# Patient Record
Sex: FEMALE | Race: WHITE | NOT HISPANIC OR LATINO | Employment: FULL TIME | URBAN - METROPOLITAN AREA
[De-identification: names, ages, dates, MRNs, and addresses within clinical notes are randomized per-mention and may not be internally consistent; named-entity substitution may affect disease eponyms.]

---
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
## 2024-04-12 ENCOUNTER — TELEPHONE (OUTPATIENT)
Dept: OBSTETRICS AND GYNECOLOGY | Facility: CLINIC | Age: 19
End: 2024-04-12
Payer: COMMERCIAL

## 2024-04-12 NOTE — TELEPHONE ENCOUNTER
----- Message from Karen Bryant sent at 4/12/2024  2:07 PM CDT -----  Contact: Self  Nithin Hammonds  MRN: [id number]  Home Phone      [phone]  Work Phone      Not on file.  Mobile          [phone]    Patient Care Team:  Fan James NP as PCP - General (Family Medicine)  Manuel Goyal MD as Obstetrician (Obstetrics)  OB? No  What phone number can you be reached at? [phone]  Message: pt has questions about chlamydia

## 2024-04-12 NOTE — TELEPHONE ENCOUNTER
Pt was called and she stated she had vaginal cultures done and desires second opinion. Pt had cultures done in [place] by another MD. Appt scheduled and she voiced understanding.

## 2024-04-15 ENCOUNTER — TELEPHONE (OUTPATIENT)
Dept: OBSTETRICS AND GYNECOLOGY | Facility: CLINIC | Age: 19
End: 2024-04-15
Payer: COMMERCIAL

## 2024-04-15 NOTE — TELEPHONE ENCOUNTER
Patient states that she was having some vaginal odor and discharge and went to a clinic in [place] on 3/26/24 and had STD testing that was negative. She reports she went back on 4/9/24 and was retested and that one came back positive for Trichomoniasis. Patient states the symptoms were the same for both times. Patient states they called her today to notify her of the positive Trichomoniasis. Patient states that they sent in a prescription for abx to her pharmacy. Patient would like a second opinion since one negative and one positive within 1 week. Patient states that she has appt for Wednesday but her work schedule changed and would like to know if she could come tomorrow instead. Patients appt moved to tomorrow. Patient instructed to  the abx but wait until after she sees Dr Stallings tomorrow to start taking them. Patient verbalized understanding.

## 2024-04-15 NOTE — TELEPHONE ENCOUNTER
----- Message from Priscilla Ruff MA sent at 4/15/2024 10:01 AM CDT -----  Contact: self  Nithin Hammonds  MRN: [id number]  Home Phone      [phone]  Work Phone      Not on file.  Mobile          [phone]    Patient Care Team:  Fan James NP as PCP - General (Family Medicine)  Manuel Goyal MD as Obstetrician (Obstetrics)  Katey Kirkpatrick MD as Consulting Physician (Obstetrics and Gynecology)  OB? No  What phone number can you be reached at? [phone]  Message: Has questions regarding up coming appt and last appt.

## 2024-04-16 ENCOUNTER — OFFICE VISIT (OUTPATIENT)
Dept: OBSTETRICS AND GYNECOLOGY | Facility: CLINIC | Age: 19
End: 2024-04-16
Payer: COMMERCIAL

## 2024-04-16 VITALS
RESPIRATION RATE: 16 BRPM | HEART RATE: 93 BPM | OXYGEN SATURATION: 100 % | HEIGHT: 63 IN | SYSTOLIC BLOOD PRESSURE: 120 MMHG | DIASTOLIC BLOOD PRESSURE: 78 MMHG | BODY MASS INDEX: 26.39 KG/M2 | WEIGHT: 148.94 LBS

## 2024-04-16 DIAGNOSIS — N89.8 VAGINAL ODOR: Primary | ICD-10-CM

## 2024-04-16 PROCEDURE — 3078F DIAST BP <80 MM HG: CPT | Mod: CPTII,S$GLB,, | Performed by: OBSTETRICS & GYNECOLOGY

## 2024-04-16 PROCEDURE — 3008F BODY MASS INDEX DOCD: CPT | Mod: CPTII,S$GLB,, | Performed by: OBSTETRICS & GYNECOLOGY

## 2024-04-16 PROCEDURE — 99213 OFFICE O/P EST LOW 20 MIN: CPT | Mod: S$GLB,,, | Performed by: OBSTETRICS & GYNECOLOGY

## 2024-04-16 PROCEDURE — 3074F SYST BP LT 130 MM HG: CPT | Mod: CPTII,S$GLB,, | Performed by: OBSTETRICS & GYNECOLOGY

## 2024-04-16 PROCEDURE — 99999 PR PBB SHADOW E&M-EST. PATIENT-LVL III: CPT | Mod: PBBFAC,,, | Performed by: OBSTETRICS & GYNECOLOGY

## 2024-04-16 PROCEDURE — 1159F MED LIST DOCD IN RCRD: CPT | Mod: CPTII,S$GLB,, | Performed by: OBSTETRICS & GYNECOLOGY

## 2024-04-16 PROCEDURE — 81514 NFCT DS BV&VAGINITIS DNA ALG: CPT | Performed by: OBSTETRICS & GYNECOLOGY

## 2024-04-17 ENCOUNTER — PATIENT MESSAGE (OUTPATIENT)
Dept: OBSTETRICS AND GYNECOLOGY | Facility: CLINIC | Age: 19
End: 2024-04-17

## 2024-04-17 LAB
BACTERIAL VAGINOSIS DNA: NEGATIVE
CANDIDA GLABRATA DNA: NEGATIVE
CANDIDA KRUSEI DNA: NEGATIVE
CANDIDA RRNA VAG QL PROBE: NEGATIVE
T VAGINALIS RRNA GENITAL QL PROBE: NEGATIVE

## 2024-04-22 ENCOUNTER — TELEPHONE (OUTPATIENT)
Dept: OBSTETRICS AND GYNECOLOGY | Facility: CLINIC | Age: 19
End: 2024-04-22
Payer: COMMERCIAL

## 2024-04-22 NOTE — TELEPHONE ENCOUNTER
Spoke with pt in regards to referral. Pt says that she already saw another provider and no longer needs the appointment.

## 2024-05-02 ENCOUNTER — TELEPHONE (OUTPATIENT)
Dept: OBSTETRICS AND GYNECOLOGY | Facility: CLINIC | Age: 19
End: 2024-05-02
Payer: COMMERCIAL

## 2024-05-02 NOTE — TELEPHONE ENCOUNTER
----- Message from Karen Bryant sent at 5/2/2024  8:52 AM CDT -----  Contact: Self  Nithin Hammonds  MRN: [id number]  Home Phone      [phone]  Work Phone      Not on file.  Mobile          [phone]    Patient Care Team:  Fan James NP as PCP - General (Family Medicine)  Manuel Goyal MD as Obstetrician (Obstetrics)  Katey Kirkpatrick MD as Consulting Physician (Obstetrics and Gynecology)  OB? No  What phone number can you be reached at? [phone]  Message: pt states she is having pain in her stomach. States she has been without a period for a month but has had negative pregnancy tests

## 2024-06-05 ENCOUNTER — PATIENT MESSAGE (OUTPATIENT)
Dept: OBSTETRICS AND GYNECOLOGY | Facility: CLINIC | Age: 19
End: 2024-06-05
Payer: COMMERCIAL

## 2024-07-16 ENCOUNTER — PATIENT MESSAGE (OUTPATIENT)
Dept: OBSTETRICS AND GYNECOLOGY | Facility: CLINIC | Age: 19
End: 2024-07-16
Payer: COMMERCIAL

## 2024-10-28 ENCOUNTER — LAB VISIT (OUTPATIENT)
Dept: LAB | Facility: HOSPITAL | Age: 19
End: 2024-10-28
Attending: STUDENT IN AN ORGANIZED HEALTH CARE EDUCATION/TRAINING PROGRAM
Payer: COMMERCIAL

## 2024-10-28 ENCOUNTER — HOSPITAL ENCOUNTER (EMERGENCY)
Facility: HOSPITAL | Age: 19
Discharge: HOME | End: 2024-10-28
Attending: SURGERY
Payer: COMMERCIAL

## 2024-10-28 ENCOUNTER — PATIENT MESSAGE (OUTPATIENT)
Dept: OBSTETRICS AND GYNECOLOGY | Facility: CLINIC | Age: 19
End: 2024-10-28
Payer: COMMERCIAL

## 2024-10-28 VITALS
BODY MASS INDEX: 28.93 KG/M2 | DIASTOLIC BLOOD PRESSURE: 68 MMHG | HEART RATE: 88 BPM | HEIGHT: 63 IN | TEMPERATURE: 99 F | SYSTOLIC BLOOD PRESSURE: 117 MMHG | OXYGEN SATURATION: 100 % | WEIGHT: 163.25 LBS | RESPIRATION RATE: 19 BRPM

## 2024-10-28 DIAGNOSIS — Z32.00 ENCOUNTER FOR PREGNANCY TEST, RESULT UNKNOWN: ICD-10-CM

## 2024-10-28 DIAGNOSIS — Z32.00 ENCOUNTER FOR PREGNANCY TEST, RESULT UNKNOWN: Primary | ICD-10-CM

## 2024-10-28 DIAGNOSIS — F41.9 ANXIETY: Primary | ICD-10-CM

## 2024-10-28 DIAGNOSIS — O20.9 VAGINAL BLEEDING IN PREGNANCY, FIRST TRIMESTER: ICD-10-CM

## 2024-10-28 LAB
ALBUMIN SERPL BCP-MCNC: 4.2 G/DL (ref 3.5–5.2)
ALP SERPL-CCNC: 58 U/L (ref 40–150)
ALT SERPL W/O P-5'-P-CCNC: 18 U/L (ref 10–44)
ANION GAP SERPL CALC-SCNC: 12 MMOL/L (ref 8–16)
AST SERPL-CCNC: 15 U/L (ref 10–40)
B-HCG UR QL: POSITIVE
BACTERIA #/AREA URNS HPF: ABNORMAL /HPF
BASOPHILS # BLD AUTO: 0.07 K/UL (ref 0–0.2)
BASOPHILS NFR BLD: 0.9 % (ref 0–1.9)
BILIRUB SERPL-MCNC: 0.4 MG/DL (ref 0.1–1)
BILIRUB UR QL STRIP: NEGATIVE
BUN SERPL-MCNC: 10 MG/DL (ref 6–20)
CALCIUM SERPL-MCNC: 9.2 MG/DL (ref 8.7–10.5)
CHLORIDE SERPL-SCNC: 106 MMOL/L (ref 95–110)
CLARITY UR: ABNORMAL
CO2 SERPL-SCNC: 23 MMOL/L (ref 23–29)
COLOR UR: YELLOW
CREAT SERPL-MCNC: 0.9 MG/DL (ref 0.5–1.4)
DIFFERENTIAL METHOD BLD: NORMAL
EOSINOPHIL # BLD AUTO: 0.4 K/UL (ref 0–0.5)
EOSINOPHIL NFR BLD: 5.2 % (ref 0–8)
ERYTHROCYTE [DISTWIDTH] IN BLOOD BY AUTOMATED COUNT: 12.6 % (ref 11.5–14.5)
EST. GFR  (NO RACE VARIABLE): >60 ML/MIN/1.73 M^2
GLUCOSE SERPL-MCNC: 92 MG/DL (ref 70–110)
GLUCOSE UR QL STRIP: NEGATIVE
HCG INTACT+B SERPL-ACNC: 345 MIU/ML
HCT VFR BLD AUTO: 37.1 % (ref 37–48.5)
HGB BLD-MCNC: 12.5 G/DL (ref 12–16)
HGB UR QL STRIP: ABNORMAL
HYALINE CASTS #/AREA URNS LPF: 0 /LPF
IMM GRANULOCYTES # BLD AUTO: 0.01 K/UL (ref 0–0.04)
IMM GRANULOCYTES NFR BLD AUTO: 0.1 % (ref 0–0.5)
KETONES UR QL STRIP: ABNORMAL
LEUKOCYTE ESTERASE UR QL STRIP: NEGATIVE
LYMPHOCYTES # BLD AUTO: 3.2 K/UL (ref 1–4.8)
LYMPHOCYTES NFR BLD: 39.6 % (ref 18–48)
MCH RBC QN AUTO: 29.8 PG (ref 27–31)
MCHC RBC AUTO-ENTMCNC: 33.7 G/DL (ref 32–36)
MCV RBC AUTO: 88 FL (ref 82–98)
MICROSCOPIC COMMENT: ABNORMAL
MONOCYTES # BLD AUTO: 0.7 K/UL (ref 0.3–1)
MONOCYTES NFR BLD: 8.1 % (ref 4–15)
NEUTROPHILS # BLD AUTO: 3.7 K/UL (ref 1.8–7.7)
NEUTROPHILS NFR BLD: 46.1 % (ref 38–73)
NITRITE UR QL STRIP: NEGATIVE
NRBC BLD-RTO: 0 /100 WBC
PH UR STRIP: 7 [PH] (ref 5–8)
PLATELET # BLD AUTO: 282 K/UL (ref 150–450)
PMV BLD AUTO: 9.6 FL (ref 9.2–12.9)
POTASSIUM SERPL-SCNC: 3.6 MMOL/L (ref 3.5–5.1)
PROT SERPL-MCNC: 7.5 G/DL (ref 6–8.4)
PROT UR QL STRIP: ABNORMAL
RBC # BLD AUTO: 4.2 M/UL (ref 4–5.4)
RBC #/AREA URNS HPF: >100 /HPF (ref 0–4)
SODIUM SERPL-SCNC: 141 MMOL/L (ref 136–145)
SP GR UR STRIP: 1.02 (ref 1–1.03)
SQUAMOUS #/AREA URNS HPF: 3 /HPF
URN SPEC COLLECT METH UR: ABNORMAL
UROBILINOGEN UR STRIP-ACNC: ABNORMAL EU/DL
WBC # BLD AUTO: 8.06 K/UL (ref 3.9–12.7)
WBC #/AREA URNS HPF: 0 /HPF (ref 0–5)

## 2024-10-28 PROCEDURE — 99283 EMERGENCY DEPT VISIT LOW MDM: CPT

## 2024-10-28 PROCEDURE — 81000 URINALYSIS NONAUTO W/SCOPE: CPT | Performed by: NURSE PRACTITIONER

## 2024-10-28 PROCEDURE — 81025 URINE PREGNANCY TEST: CPT | Performed by: NURSE PRACTITIONER

## 2024-10-28 PROCEDURE — 36415 COLL VENOUS BLD VENIPUNCTURE: CPT | Performed by: STUDENT IN AN ORGANIZED HEALTH CARE EDUCATION/TRAINING PROGRAM

## 2024-10-28 PROCEDURE — 85025 COMPLETE CBC W/AUTO DIFF WBC: CPT | Performed by: NURSE PRACTITIONER

## 2024-10-28 PROCEDURE — 80053 COMPREHEN METABOLIC PANEL: CPT | Performed by: NURSE PRACTITIONER

## 2024-10-28 PROCEDURE — 84702 CHORIONIC GONADOTROPIN TEST: CPT | Performed by: STUDENT IN AN ORGANIZED HEALTH CARE EDUCATION/TRAINING PROGRAM

## 2024-10-29 DIAGNOSIS — O46.90 VAGINAL BLEEDING IN PREGNANCY: Primary | ICD-10-CM

## 2024-10-30 ENCOUNTER — OFFICE VISIT (OUTPATIENT)
Dept: OBSTETRICS AND GYNECOLOGY | Facility: CLINIC | Age: 19
End: 2024-10-30
Payer: COMMERCIAL

## 2024-10-30 ENCOUNTER — LAB VISIT (OUTPATIENT)
Dept: LAB | Facility: HOSPITAL | Age: 19
End: 2024-10-30
Attending: STUDENT IN AN ORGANIZED HEALTH CARE EDUCATION/TRAINING PROGRAM
Payer: COMMERCIAL

## 2024-10-30 VITALS
HEIGHT: 63 IN | BODY MASS INDEX: 29.05 KG/M2 | WEIGHT: 163.94 LBS | SYSTOLIC BLOOD PRESSURE: 112 MMHG | HEART RATE: 74 BPM | DIASTOLIC BLOOD PRESSURE: 78 MMHG

## 2024-10-30 DIAGNOSIS — O20.9 BLEEDING IN EARLY PREGNANCY: Primary | ICD-10-CM

## 2024-10-30 DIAGNOSIS — Z11.3 SCREENING EXAMINATION FOR VENEREAL DISEASE: ICD-10-CM

## 2024-10-30 DIAGNOSIS — Z32.01 POSITIVE PREGNANCY TEST: ICD-10-CM

## 2024-10-30 DIAGNOSIS — O46.90 VAGINAL BLEEDING IN PREGNANCY: ICD-10-CM

## 2024-10-30 LAB — HCG INTACT+B SERPL-ACNC: 580 MIU/ML

## 2024-10-30 PROCEDURE — 36415 COLL VENOUS BLD VENIPUNCTURE: CPT | Performed by: STUDENT IN AN ORGANIZED HEALTH CARE EDUCATION/TRAINING PROGRAM

## 2024-10-30 PROCEDURE — 84702 CHORIONIC GONADOTROPIN TEST: CPT | Performed by: STUDENT IN AN ORGANIZED HEALTH CARE EDUCATION/TRAINING PROGRAM

## 2024-10-30 PROCEDURE — 99999 PR PBB SHADOW E&M-EST. PATIENT-LVL III: CPT | Mod: PBBFAC,,, | Performed by: STUDENT IN AN ORGANIZED HEALTH CARE EDUCATION/TRAINING PROGRAM

## 2024-10-30 PROCEDURE — 87591 N.GONORRHOEAE DNA AMP PROB: CPT | Performed by: STUDENT IN AN ORGANIZED HEALTH CARE EDUCATION/TRAINING PROGRAM

## 2024-10-30 PROCEDURE — 87086 URINE CULTURE/COLONY COUNT: CPT | Performed by: STUDENT IN AN ORGANIZED HEALTH CARE EDUCATION/TRAINING PROGRAM

## 2024-10-30 PROCEDURE — 87491 CHLMYD TRACH DNA AMP PROBE: CPT | Performed by: STUDENT IN AN ORGANIZED HEALTH CARE EDUCATION/TRAINING PROGRAM

## 2024-10-30 RX ORDER — CIPROFLOXACIN 500 MG/1
500 TABLET ORAL 2 TIMES DAILY
Qty: 14 TABLET | Refills: 0 | Status: CANCELLED | OUTPATIENT
Start: 2024-10-30

## 2024-10-30 RX ORDER — PHENAZOPYRIDINE HYDROCHLORIDE 200 MG/1
200 TABLET, FILM COATED ORAL 3 TIMES DAILY
Qty: 9 TABLET | Refills: 0 | Status: CANCELLED | OUTPATIENT
Start: 2024-10-30 | End: 2024-11-09

## 2024-10-30 NOTE — PROGRESS NOTES
Beta HCG did rise but not quite as much as I would have liked. I would like one more value in 48 hours.

## 2024-10-31 LAB
BACTERIA UR CULT: NORMAL
BACTERIA UR CULT: NORMAL
C TRACH DNA SPEC QL NAA+PROBE: NOT DETECTED
N GONORRHOEA DNA SPEC QL NAA+PROBE: NOT DETECTED

## 2024-11-01 ENCOUNTER — TELEPHONE (OUTPATIENT)
Dept: OBSTETRICS AND GYNECOLOGY | Facility: CLINIC | Age: 19
End: 2024-11-01
Payer: MEDICAID

## 2024-11-01 ENCOUNTER — LAB VISIT (OUTPATIENT)
Dept: LAB | Facility: HOSPITAL | Age: 19
End: 2024-11-01
Attending: STUDENT IN AN ORGANIZED HEALTH CARE EDUCATION/TRAINING PROGRAM
Payer: MEDICAID

## 2024-11-01 ENCOUNTER — HOSPITAL ENCOUNTER (OUTPATIENT)
Facility: HOSPITAL | Age: 19
Discharge: HOME | End: 2024-11-02
Attending: STUDENT IN AN ORGANIZED HEALTH CARE EDUCATION/TRAINING PROGRAM | Admitting: OBSTETRICS & GYNECOLOGY
Payer: MEDICAID

## 2024-11-01 DIAGNOSIS — O20.9 BLEEDING IN EARLY PREGNANCY: ICD-10-CM

## 2024-11-01 DIAGNOSIS — O20.9 VAGINAL BLEEDING IN PREGNANCY, FIRST TRIMESTER: ICD-10-CM

## 2024-11-01 DIAGNOSIS — O00.90 RUPTURED ECTOPIC PREGNANCY: Primary | ICD-10-CM

## 2024-11-01 LAB — HCG INTACT+B SERPL-ACNC: 1136 MIU/ML

## 2024-11-01 PROCEDURE — 81025 URINE PREGNANCY TEST: CPT | Performed by: STUDENT IN AN ORGANIZED HEALTH CARE EDUCATION/TRAINING PROGRAM

## 2024-11-01 PROCEDURE — 84702 CHORIONIC GONADOTROPIN TEST: CPT | Performed by: STUDENT IN AN ORGANIZED HEALTH CARE EDUCATION/TRAINING PROGRAM

## 2024-11-01 PROCEDURE — 86900 BLOOD TYPING SEROLOGIC ABO: CPT | Performed by: STUDENT IN AN ORGANIZED HEALTH CARE EDUCATION/TRAINING PROGRAM

## 2024-11-01 PROCEDURE — 36415 COLL VENOUS BLD VENIPUNCTURE: CPT | Performed by: STUDENT IN AN ORGANIZED HEALTH CARE EDUCATION/TRAINING PROGRAM

## 2024-11-01 PROCEDURE — 85025 COMPLETE CBC W/AUTO DIFF WBC: CPT | Performed by: STUDENT IN AN ORGANIZED HEALTH CARE EDUCATION/TRAINING PROGRAM

## 2024-11-01 PROCEDURE — 80053 COMPREHEN METABOLIC PANEL: CPT | Performed by: STUDENT IN AN ORGANIZED HEALTH CARE EDUCATION/TRAINING PROGRAM

## 2024-11-01 PROCEDURE — 99285 EMERGENCY DEPT VISIT HI MDM: CPT | Mod: 25

## 2024-11-01 PROCEDURE — 81000 URINALYSIS NONAUTO W/SCOPE: CPT | Performed by: STUDENT IN AN ORGANIZED HEALTH CARE EDUCATION/TRAINING PROGRAM

## 2024-11-01 PROCEDURE — 83690 ASSAY OF LIPASE: CPT | Performed by: STUDENT IN AN ORGANIZED HEALTH CARE EDUCATION/TRAINING PROGRAM

## 2024-11-01 NOTE — LETTER
November 2, 2024         [address] [facility] [address]  Phone: [phone]  Fax: [phone]       Patient: Nithin Hammonds   YOB: 2005  Date of Visit: 11/02/2024    To Whom It May Concern:    Viet Hammonds  was at [facility] on 11/02/2024. The patient may return to work/school on 11/11/24 with lifting restrictions (no lifting >10lbs )and no strenuous activity until cleared by doctor. If you have any questions or concerns, or if I can be of further assistance, please do not hesitate to contact me.    Sincerely,      Jane Ruff RN

## 2024-11-02 ENCOUNTER — ANESTHESIA EVENT (OUTPATIENT)
Dept: SURGERY | Facility: HOSPITAL | Age: 19
End: 2024-11-02
Payer: MEDICAID

## 2024-11-02 ENCOUNTER — ANESTHESIA (OUTPATIENT)
Dept: SURGERY | Facility: HOSPITAL | Age: 19
End: 2024-11-02
Payer: MEDICAID

## 2024-11-02 VITALS
SYSTOLIC BLOOD PRESSURE: 109 MMHG | HEIGHT: 62 IN | HEART RATE: 86 BPM | TEMPERATURE: 98 F | BODY MASS INDEX: 30.39 KG/M2 | WEIGHT: 165.13 LBS | DIASTOLIC BLOOD PRESSURE: 58 MMHG | RESPIRATION RATE: 16 BRPM | OXYGEN SATURATION: 99 %

## 2024-11-02 PROBLEM — O00.90 RUPTURED ECTOPIC PREGNANCY: Status: ACTIVE | Noted: 2024-11-02

## 2024-11-02 LAB
ALBUMIN SERPL BCP-MCNC: 4.1 G/DL (ref 3.5–5.2)
ALP SERPL-CCNC: 64 U/L (ref 40–150)
ALT SERPL W/O P-5'-P-CCNC: 17 U/L (ref 10–44)
AMORPH CRY URNS QL MICRO: ABNORMAL
ANION GAP SERPL CALC-SCNC: 8 MMOL/L (ref 8–16)
AST SERPL-CCNC: 17 U/L (ref 10–40)
B-HCG UR QL: POSITIVE
BACTERIA #/AREA URNS HPF: ABNORMAL /HPF
BASOPHILS # BLD AUTO: 0.03 K/UL (ref 0–0.2)
BASOPHILS # BLD AUTO: 0.05 K/UL (ref 0–0.2)
BASOPHILS # BLD AUTO: 0.06 K/UL (ref 0–0.2)
BASOPHILS # BLD AUTO: 0.09 K/UL (ref 0–0.2)
BASOPHILS NFR BLD: 0.3 % (ref 0–1.9)
BASOPHILS NFR BLD: 0.6 % (ref 0–1.9)
BASOPHILS NFR BLD: 0.6 % (ref 0–1.9)
BASOPHILS NFR BLD: 0.9 % (ref 0–1.9)
BILIRUB SERPL-MCNC: 0.5 MG/DL (ref 0.1–1)
BILIRUB UR QL STRIP: NEGATIVE
BUN SERPL-MCNC: 14 MG/DL (ref 6–20)
CALCIUM SERPL-MCNC: 9.5 MG/DL (ref 8.7–10.5)
CHLORIDE SERPL-SCNC: 104 MMOL/L (ref 95–110)
CLARITY UR: ABNORMAL
CO2 SERPL-SCNC: 27 MMOL/L (ref 23–29)
COLOR UR: YELLOW
CREAT SERPL-MCNC: 0.7 MG/DL (ref 0.5–1.4)
DIFFERENTIAL METHOD BLD: ABNORMAL
EOSINOPHIL # BLD AUTO: 0 K/UL (ref 0–0.5)
EOSINOPHIL # BLD AUTO: 0 K/UL (ref 0–0.5)
EOSINOPHIL # BLD AUTO: 0.3 K/UL (ref 0–0.5)
EOSINOPHIL # BLD AUTO: 0.6 K/UL (ref 0–0.5)
EOSINOPHIL NFR BLD: 0.1 % (ref 0–8)
EOSINOPHIL NFR BLD: 0.5 % (ref 0–8)
EOSINOPHIL NFR BLD: 3.1 % (ref 0–8)
EOSINOPHIL NFR BLD: 6 % (ref 0–8)
ERYTHROCYTE [DISTWIDTH] IN BLOOD BY AUTOMATED COUNT: 12.5 % (ref 11.5–14.5)
EST. GFR  (NO RACE VARIABLE): >60 ML/MIN/1.73 M^2
GLUCOSE SERPL-MCNC: 95 MG/DL (ref 70–110)
GLUCOSE UR QL STRIP: NEGATIVE
HCT VFR BLD AUTO: 34.1 % (ref 37–48.5)
HCT VFR BLD AUTO: 34.5 % (ref 37–48.5)
HCT VFR BLD AUTO: 35.1 % (ref 37–48.5)
HCT VFR BLD AUTO: 37.5 % (ref 37–48.5)
HGB BLD-MCNC: 11.4 G/DL (ref 12–16)
HGB BLD-MCNC: 11.5 G/DL (ref 12–16)
HGB BLD-MCNC: 11.7 G/DL (ref 12–16)
HGB BLD-MCNC: 12.3 G/DL (ref 12–16)
HGB UR QL STRIP: ABNORMAL
IMM GRANULOCYTES # BLD AUTO: 0.02 K/UL (ref 0–0.04)
IMM GRANULOCYTES # BLD AUTO: 0.03 K/UL (ref 0–0.04)
IMM GRANULOCYTES # BLD AUTO: 0.04 K/UL (ref 0–0.04)
IMM GRANULOCYTES # BLD AUTO: 0.04 K/UL (ref 0–0.04)
IMM GRANULOCYTES NFR BLD AUTO: 0.2 % (ref 0–0.5)
IMM GRANULOCYTES NFR BLD AUTO: 0.3 % (ref 0–0.5)
IMM GRANULOCYTES NFR BLD AUTO: 0.3 % (ref 0–0.5)
IMM GRANULOCYTES NFR BLD AUTO: 0.4 % (ref 0–0.5)
KETONES UR QL STRIP: NEGATIVE
LEUKOCYTE ESTERASE UR QL STRIP: NEGATIVE
LIPASE SERPL-CCNC: 20 U/L (ref 4–60)
LYMPHOCYTES # BLD AUTO: 0.7 K/UL (ref 1–4.8)
LYMPHOCYTES # BLD AUTO: 1.4 K/UL (ref 1–4.8)
LYMPHOCYTES # BLD AUTO: 2.3 K/UL (ref 1–4.8)
LYMPHOCYTES # BLD AUTO: 3.4 K/UL (ref 1–4.8)
LYMPHOCYTES NFR BLD: 16.1 % (ref 18–48)
LYMPHOCYTES NFR BLD: 23.9 % (ref 18–48)
LYMPHOCYTES NFR BLD: 32.4 % (ref 18–48)
LYMPHOCYTES NFR BLD: 5.5 % (ref 18–48)
MCH RBC QN AUTO: 29.1 PG (ref 27–31)
MCH RBC QN AUTO: 29.6 PG (ref 27–31)
MCH RBC QN AUTO: 29.7 PG (ref 27–31)
MCH RBC QN AUTO: 29.9 PG (ref 27–31)
MCHC RBC AUTO-ENTMCNC: 32.8 G/DL (ref 32–36)
MCHC RBC AUTO-ENTMCNC: 33.3 G/DL (ref 32–36)
MCHC RBC AUTO-ENTMCNC: 33.3 G/DL (ref 32–36)
MCHC RBC AUTO-ENTMCNC: 33.4 G/DL (ref 32–36)
MCV RBC AUTO: 89 FL (ref 82–98)
MCV RBC AUTO: 90 FL (ref 82–98)
MICROSCOPIC COMMENT: ABNORMAL
MONOCYTES # BLD AUTO: 0.2 K/UL (ref 0.3–1)
MONOCYTES # BLD AUTO: 0.6 K/UL (ref 0.3–1)
MONOCYTES # BLD AUTO: 0.6 K/UL (ref 0.3–1)
MONOCYTES # BLD AUTO: 1 K/UL (ref 0.3–1)
MONOCYTES NFR BLD: 1.6 % (ref 4–15)
MONOCYTES NFR BLD: 6.6 % (ref 4–15)
MONOCYTES NFR BLD: 7 % (ref 4–15)
MONOCYTES NFR BLD: 9.2 % (ref 4–15)
NEUTROPHILS # BLD AUTO: 11 K/UL (ref 1.8–7.7)
NEUTROPHILS # BLD AUTO: 5.3 K/UL (ref 1.8–7.7)
NEUTROPHILS # BLD AUTO: 6.2 K/UL (ref 1.8–7.7)
NEUTROPHILS # BLD AUTO: 6.6 K/UL (ref 1.8–7.7)
NEUTROPHILS NFR BLD: 51.2 % (ref 38–73)
NEUTROPHILS NFR BLD: 65.4 % (ref 38–73)
NEUTROPHILS NFR BLD: 75.6 % (ref 38–73)
NEUTROPHILS NFR BLD: 92.2 % (ref 38–73)
NITRITE UR QL STRIP: NEGATIVE
NRBC BLD-RTO: 0 /100 WBC
PH UR STRIP: >8 [PH] (ref 5–8)
PLATELET # BLD AUTO: 252 K/UL (ref 150–450)
PLATELET # BLD AUTO: 259 K/UL (ref 150–450)
PLATELET # BLD AUTO: 277 K/UL (ref 150–450)
PLATELET # BLD AUTO: 296 K/UL (ref 150–450)
PMV BLD AUTO: 10.1 FL (ref 9.2–12.9)
PMV BLD AUTO: 9.3 FL (ref 9.2–12.9)
PMV BLD AUTO: 9.8 FL (ref 9.2–12.9)
PMV BLD AUTO: 9.9 FL (ref 9.2–12.9)
POTASSIUM SERPL-SCNC: 4.3 MMOL/L (ref 3.5–5.1)
PROT SERPL-MCNC: 7.5 G/DL (ref 6–8.4)
PROT UR QL STRIP: NEGATIVE
RBC # BLD AUTO: 3.84 M/UL (ref 4–5.4)
RBC # BLD AUTO: 3.85 M/UL (ref 4–5.4)
RBC # BLD AUTO: 3.95 M/UL (ref 4–5.4)
RBC # BLD AUTO: 4.22 M/UL (ref 4–5.4)
RBC #/AREA URNS HPF: >100 /HPF (ref 0–4)
SODIUM SERPL-SCNC: 139 MMOL/L (ref 136–145)
SP GR UR STRIP: 1.02 (ref 1–1.03)
SQUAMOUS #/AREA URNS HPF: 2 /HPF
URN SPEC COLLECT METH UR: ABNORMAL
UROBILINOGEN UR STRIP-ACNC: 1 EU/DL
WBC # BLD AUTO: 10.42 K/UL (ref 3.9–12.7)
WBC # BLD AUTO: 11.91 K/UL (ref 3.9–12.7)
WBC # BLD AUTO: 8.76 K/UL (ref 3.9–12.7)
WBC # BLD AUTO: 9.42 K/UL (ref 3.9–12.7)
WBC #/AREA URNS HPF: 2 /HPF (ref 0–5)

## 2024-11-02 PROCEDURE — 85025 COMPLETE CBC W/AUTO DIFF WBC: CPT | Mod: 91 | Performed by: OBSTETRICS & GYNECOLOGY

## 2024-11-02 PROCEDURE — 25000003 PHARM REV CODE 250: Performed by: NURSE ANESTHETIST, CERTIFIED REGISTERED

## 2024-11-02 PROCEDURE — 63600175 PHARM REV CODE 636 W HCPCS: Performed by: NURSE ANESTHETIST, CERTIFIED REGISTERED

## 2024-11-02 PROCEDURE — 36415 COLL VENOUS BLD VENIPUNCTURE: CPT | Performed by: STUDENT IN AN ORGANIZED HEALTH CARE EDUCATION/TRAINING PROGRAM

## 2024-11-02 PROCEDURE — 37000009 HC ANESTHESIA EA ADD 15 MINS: Performed by: NURSE ANESTHETIST, CERTIFIED REGISTERED

## 2024-11-02 PROCEDURE — G0378 HOSPITAL OBSERVATION PER HR: HCPCS

## 2024-11-02 PROCEDURE — 85025 COMPLETE CBC W/AUTO DIFF WBC: CPT | Performed by: STUDENT IN AN ORGANIZED HEALTH CARE EDUCATION/TRAINING PROGRAM

## 2024-11-02 PROCEDURE — 88305 TISSUE EXAM BY PATHOLOGIST: CPT | Mod: 26,,, | Performed by: PATHOLOGY

## 2024-11-02 PROCEDURE — 88305 TISSUE EXAM BY PATHOLOGIST: CPT | Performed by: PATHOLOGY

## 2024-11-02 PROCEDURE — 25000003 PHARM REV CODE 250: Performed by: OBSTETRICS & GYNECOLOGY

## 2024-11-02 PROCEDURE — 00840 ANES IPER PX LOWER ABD NOS: CPT | Mod: QZ,P2 | Performed by: NURSE ANESTHETIST, CERTIFIED REGISTERED

## 2024-11-02 PROCEDURE — D9220AH HC ANESTHESIA PROFESSIONAL FEE: Mod: QZ,P2 | Performed by: NURSE ANESTHETIST, CERTIFIED REGISTERED

## 2024-11-02 PROCEDURE — 94760 N-INVAS EAR/PLS OXIMETRY 1: CPT

## 2024-11-02 PROCEDURE — 37000008 HC ANESTHESIA 1ST 15 MINUTES: Performed by: NURSE ANESTHETIST, CERTIFIED REGISTERED

## 2024-11-02 PROCEDURE — 36415 COLL VENOUS BLD VENIPUNCTURE: CPT | Performed by: OBSTETRICS & GYNECOLOGY

## 2024-11-02 RX ORDER — ACETAMINOPHEN 10 MG/ML
INJECTION, SOLUTION INTRAVENOUS
Status: DISCONTINUED | OUTPATIENT
Start: 2024-11-02 | End: 2024-11-02

## 2024-11-02 RX ORDER — SODIUM CHLORIDE 9 MG/ML
INJECTION, SOLUTION INTRAVENOUS CONTINUOUS PRN
Status: DISCONTINUED | OUTPATIENT
Start: 2024-11-02 | End: 2024-11-02

## 2024-11-02 RX ORDER — DIPHENHYDRAMINE HYDROCHLORIDE 50 MG/ML
INJECTION INTRAMUSCULAR; INTRAVENOUS
Status: DISCONTINUED | OUTPATIENT
Start: 2024-11-02 | End: 2024-11-02

## 2024-11-02 RX ORDER — DEXAMETHASONE SODIUM PHOSPHATE 4 MG/ML
INJECTION, SOLUTION INTRA-ARTICULAR; INTRALESIONAL; INTRAMUSCULAR; INTRAVENOUS; SOFT TISSUE
Status: DISCONTINUED | OUTPATIENT
Start: 2024-11-02 | End: 2024-11-02

## 2024-11-02 RX ORDER — LIDOCAINE HYDROCHLORIDE 10 MG/ML
INJECTION, SOLUTION EPIDURAL; INFILTRATION; INTRACAUDAL; PERINEURAL
Status: DISCONTINUED | OUTPATIENT
Start: 2024-11-02 | End: 2024-11-02

## 2024-11-02 RX ORDER — MIDAZOLAM HYDROCHLORIDE 1 MG/ML
INJECTION INTRAMUSCULAR; INTRAVENOUS
Status: DISCONTINUED | OUTPATIENT
Start: 2024-11-02 | End: 2024-11-02

## 2024-11-02 RX ORDER — DIPHENHYDRAMINE HCL 25 MG
25 CAPSULE ORAL EVERY 4 HOURS PRN
Status: DISCONTINUED | OUTPATIENT
Start: 2024-11-02 | End: 2024-11-02 | Stop reason: HOSPADM

## 2024-11-02 RX ORDER — FENTANYL CITRATE 50 UG/ML
INJECTION, SOLUTION INTRAMUSCULAR; INTRAVENOUS
Status: DISCONTINUED | OUTPATIENT
Start: 2024-11-02 | End: 2024-11-02

## 2024-11-02 RX ORDER — HYDROCODONE BITARTRATE AND ACETAMINOPHEN 5; 325 MG/1; MG/1
1 TABLET ORAL EVERY 4 HOURS PRN
Status: DISCONTINUED | OUTPATIENT
Start: 2024-11-02 | End: 2024-11-02 | Stop reason: HOSPADM

## 2024-11-02 RX ORDER — TALC
6 POWDER (GRAM) TOPICAL NIGHTLY PRN
Status: DISCONTINUED | OUTPATIENT
Start: 2024-11-02 | End: 2024-11-02 | Stop reason: HOSPADM

## 2024-11-02 RX ORDER — IBUPROFEN 800 MG/1
800 TABLET ORAL EVERY 8 HOURS PRN
Qty: 30 TABLET | Refills: 0 | Status: SHIPPED | OUTPATIENT
Start: 2024-11-02

## 2024-11-02 RX ORDER — SODIUM CHLORIDE 9 MG/ML
INJECTION, SOLUTION INTRAVENOUS CONTINUOUS
Status: DISCONTINUED | OUTPATIENT
Start: 2024-11-02 | End: 2024-11-02 | Stop reason: HOSPADM

## 2024-11-02 RX ORDER — IBUPROFEN 600 MG/1
600 TABLET ORAL EVERY 6 HOURS PRN
Status: DISCONTINUED | OUTPATIENT
Start: 2024-11-02 | End: 2024-11-02 | Stop reason: HOSPADM

## 2024-11-02 RX ORDER — PROPOFOL 10 MG/ML
VIAL (ML) INTRAVENOUS
Status: DISCONTINUED | OUTPATIENT
Start: 2024-11-02 | End: 2024-11-02

## 2024-11-02 RX ORDER — FAMOTIDINE 20 MG/1
20 TABLET, FILM COATED ORAL
Status: DISCONTINUED | OUTPATIENT
Start: 2024-11-02 | End: 2024-11-02 | Stop reason: HOSPADM

## 2024-11-02 RX ORDER — KETOROLAC TROMETHAMINE 30 MG/ML
INJECTION, SOLUTION INTRAMUSCULAR; INTRAVENOUS
Status: DISCONTINUED | OUTPATIENT
Start: 2024-11-02 | End: 2024-11-02

## 2024-11-02 RX ORDER — ROCURONIUM BROMIDE 10 MG/ML
INJECTION, SOLUTION INTRAVENOUS
Status: DISCONTINUED | OUTPATIENT
Start: 2024-11-02 | End: 2024-11-02

## 2024-11-02 RX ORDER — ONDANSETRON HYDROCHLORIDE 2 MG/ML
INJECTION, SOLUTION INTRAVENOUS
Status: DISCONTINUED | OUTPATIENT
Start: 2024-11-02 | End: 2024-11-02

## 2024-11-02 RX ORDER — HYDROCODONE BITARTRATE AND ACETAMINOPHEN 5; 325 MG/1; MG/1
1 TABLET ORAL EVERY 4 HOURS PRN
Qty: 20 TABLET | Refills: 0 | Status: SHIPPED | OUTPATIENT
Start: 2024-11-02

## 2024-11-02 RX ORDER — HYDROCODONE BITARTRATE AND ACETAMINOPHEN 10; 325 MG/1; MG/1
1 TABLET ORAL EVERY 4 HOURS PRN
Status: DISCONTINUED | OUTPATIENT
Start: 2024-11-02 | End: 2024-11-02 | Stop reason: HOSPADM

## 2024-11-02 RX ORDER — SODIUM CHLORIDE 0.9 % (FLUSH) 0.9 %
10 SYRINGE (ML) INJECTION
Status: DISCONTINUED | OUTPATIENT
Start: 2024-11-02 | End: 2024-11-02 | Stop reason: HOSPADM

## 2024-11-02 RX ORDER — AMOXICILLIN 250 MG
1 CAPSULE ORAL 2 TIMES DAILY
Status: DISCONTINUED | OUTPATIENT
Start: 2024-11-02 | End: 2024-11-02 | Stop reason: HOSPADM

## 2024-11-02 RX ORDER — ONDANSETRON 8 MG/1
8 TABLET, ORALLY DISINTEGRATING ORAL EVERY 8 HOURS PRN
Status: DISCONTINUED | OUTPATIENT
Start: 2024-11-02 | End: 2024-11-02 | Stop reason: HOSPADM

## 2024-11-02 RX ADMIN — KETOROLAC TROMETHAMINE 30 MG: 30 INJECTION, SOLUTION INTRAMUSCULAR at 09:11

## 2024-11-02 RX ADMIN — DIPHENHYDRAMINE HYDROCHLORIDE 12.5 MG: 50 INJECTION INTRAMUSCULAR; INTRAVENOUS at 08:11

## 2024-11-02 RX ADMIN — LIDOCAINE HYDROCHLORIDE 5 ML: 10 INJECTION, SOLUTION EPIDURAL; INFILTRATION; INTRACAUDAL; PERINEURAL at 08:11

## 2024-11-02 RX ADMIN — SUGAMMADEX 200 MG: 100 INJECTION, SOLUTION INTRAVENOUS at 09:11

## 2024-11-02 RX ADMIN — DEXMEDETOMIDINE 8 MCG: 200 INJECTION, SOLUTION INTRAVENOUS at 08:11

## 2024-11-02 RX ADMIN — SODIUM CHLORIDE: 9 INJECTION, SOLUTION INTRAVENOUS at 08:11

## 2024-11-02 RX ADMIN — DEXAMETHASONE SODIUM PHOSPHATE 4 MG: 4 INJECTION, SOLUTION INTRAMUSCULAR; INTRAVENOUS at 08:11

## 2024-11-02 RX ADMIN — FENTANYL CITRATE 100 MCG: 50 INJECTION, SOLUTION INTRAMUSCULAR; INTRAVENOUS at 08:11

## 2024-11-02 RX ADMIN — FENTANYL CITRATE 50 MCG: 50 INJECTION, SOLUTION INTRAMUSCULAR; INTRAVENOUS at 09:11

## 2024-11-02 RX ADMIN — SENNOSIDES AND DOCUSATE SODIUM 1 TABLET: 8.6; 5 TABLET ORAL at 12:11

## 2024-11-02 RX ADMIN — MIDAZOLAM HYDROCHLORIDE 2 MG: 1 INJECTION, SOLUTION INTRAMUSCULAR; INTRAVENOUS at 08:11

## 2024-11-02 RX ADMIN — PROPOFOL 170 MG: 10 INJECTION, EMULSION INTRAVENOUS at 08:11

## 2024-11-02 RX ADMIN — ROCURONIUM BROMIDE 40 MG: 10 INJECTION, SOLUTION INTRAVENOUS at 08:11

## 2024-11-02 RX ADMIN — SODIUM CHLORIDE: 0.9 INJECTION, SOLUTION INTRAVENOUS at 08:11

## 2024-11-02 RX ADMIN — ONDANSETRON 4 MG: 2 INJECTION, SOLUTION INTRAMUSCULAR; INTRAVENOUS at 08:11

## 2024-11-02 RX ADMIN — DEXMEDETOMIDINE 12 MCG: 200 INJECTION, SOLUTION INTRAVENOUS at 08:11

## 2024-11-02 RX ADMIN — ACETAMINOPHEN 1000 MG: 10 INJECTION, SOLUTION INTRAVENOUS at 08:11

## 2024-11-02 NOTE — SUBJECTIVE & OBJECTIVE
Obstetric HPI:  Patient reports abdominal pain is present but with some improvement.  Yes vaginal bleeding     OB History    Para Term  AB Living   2 0 0 0 1 0   SAB IAB Ectopic Multiple Live Births   1 0 0 0 0      # Outcome Date GA Lbr Sebastian/2nd Weight Sex Type Anes PTL Lv   2 Current            1 SAB 11/15/23             Past Medical History:   Diagnosis Date    ADHD      Past Surgical History:   Procedure Laterality Date    THROAT SURGERY         PTA Medications   Medication Sig    atomoxetine (STRATTERA) 40 MG capsule Take 40 mg by mouth once daily.    dexmethylphenidate (FOCALIN XR) 40 mg 24 hr capsule Take 1 capsule by mouth every morning.       Review of patient's allergies indicates:  No Known Allergies     Family History       Problem Relation (Age of Onset)    Anemia Mother    Breast cancer Maternal Grandmother    Hypertension Father, Mother, Brother, Sister          Tobacco Use    Smoking status: Never    Smokeless tobacco: Never   Substance and Sexual Activity    Alcohol use: Not Currently    Drug use: Never    Sexual activity: Yes     Partners: Male     Birth control/protection: None     Review of Systems   Constitutional:  Negative for activity change, appetite change, chills, diaphoresis, fatigue and fever.   Eyes:  Negative for visual disturbance.   Respiratory:  Negative for cough, shortness of breath and wheezing.    Cardiovascular:  Negative for chest pain and palpitations.   Gastrointestinal:  Positive for abdominal pain. Negative for constipation, diarrhea, nausea and vomiting.   Genitourinary:  Positive for vaginal bleeding. Negative for dysuria, genital sores, pelvic pain, urgency, vaginal discharge, vaginal pain and vaginal odor.   Musculoskeletal:  Negative for back pain, joint swelling and myalgias.   Integumentary:  Negative for rash.   Neurological:  Negative for seizures, syncope, numbness and headaches.   Hematological:  Negative for adenopathy. Does not bruise/bleed  easily.   Psychiatric/Behavioral:  Negative for depression. The patient is not nervous/anxious.       Objective:     Vital Signs (Most Recent):  Temp: 98.9 °F (37.2 °C) (11/02/24 0755)  Pulse: 108 (11/02/24 0755)  Resp: 16 (11/02/24 0755)  BP: 131/68 (11/02/24 0755)  SpO2: 98 % (11/02/24 0757) Vital Signs (24h Range):  Temp:  [97.8 °F (36.6 °C)-98.9 °F (37.2 °C)] 98.9 °F (37.2 °C)  Pulse:  [] 108  Resp:  [16-20] 16  SpO2:  [98 %-100 %] 98 %  BP: (121-138)/(66-80) 131/68     Weight: 74.9 kg (165 lb 2 oz)  Body mass index is 30.2 kg/m².       Physical Exam:   Constitutional: She is oriented to person, place, and time. She appears well-developed and well-nourished. No distress.    HENT:   Head: Normocephalic and atraumatic.    Eyes: Conjunctivae and EOM are normal.      Pulmonary/Chest: Effort normal. No respiratory distress.        Abdominal: There is abdominal tenderness in the right lower quadrant and left lower quadrant. There is rebound and guarding.             Musculoskeletal: Normal range of motion.       Neurological: She is alert and oriented to person, place, and time.    Skin: Skin is warm and dry.    Psychiatric: She has a normal mood and affect. Her behavior is normal. Judgment and thought content normal.          Significant Labs:  Lab Results   Component Value Date    GROUPTRH A POS 10/31/2023    HEPBSAG Non-reactive 10/31/2023       I have personallly reviewed all pertinent lab results from the last 24 hours.  Recent Lab Results         11/02/24  0818   11/02/24  0426   11/01/24  2356   11/01/24  2353        Albumin       4.1       ALP       64       ALT       17       Amorphous, UA     Moderate         Anion Gap       8       Appearance, UA     Cloudy         AST       17       Bacteria, UA     Occasional         Baso # 0.05   0.06     0.09       Basophil % 0.6   0.6     0.9       Bilirubin (UA)     Negative         BILIRUBIN TOTAL       0.5  Comment: For infants and newborns, interpretation of  results should be based  on gestational age, weight and in agreement with clinical  observations.    Premature Infant recommended reference ranges:  Up to 24 hours.............<8.0 mg/dL  Up to 48 hours............<12.0 mg/dL  3-5 days..................<15.0 mg/dL  6-29 days.................<15.0 mg/dL         BUN       14       Calcium       9.5       Chloride       104       CO2       27       Color, UA     Yellow         Creatinine       0.7       Differential Method Automated   Automated     Automated       eGFR       >60       Eos # 0.0   0.3     0.6       Eos % 0.5   3.1     6.0       Glucose       95       Glucose, UA     Negative         Gran # (ANC) 6.6   6.2     5.3       Gran % 75.6   65.4     51.2       Hematocrit 34.1   35.1     37.5       Hemoglobin 11.4   11.7     12.3       Immature Grans (Abs) 0.02  Comment: Mild elevation in immature granulocytes is non specific and   can be seen in a variety of conditions including stress response,   acute inflammation, trauma and pregnancy. Correlation with other   laboratory and clinical findings is essential.     0.04  Comment: Mild elevation in immature granulocytes is non specific and   can be seen in a variety of conditions including stress response,   acute inflammation, trauma and pregnancy. Correlation with other   laboratory and clinical findings is essential.       0.03  Comment: Mild elevation in immature granulocytes is non specific and   can be seen in a variety of conditions including stress response,   acute inflammation, trauma and pregnancy. Correlation with other   laboratory and clinical findings is essential.         Immature Granulocytes 0.2   0.4     0.3       Ketones, UA     Negative         Leukocyte Esterase, UA     Negative         Lipase       20       Lymph # 1.4   2.3     3.4       Lymph % 16.1   23.9     32.4       MCH 29.7   29.6     29.1       MCHC 33.4   33.3     32.8       MCV 89   89     89       Microscopic Comment     SEE  COMMENT  Comment: Other formed elements not mentioned in the report are not   present in the microscopic examination.            Mono # 0.6   0.6     1.0       Mono % 7.0   6.6     9.2       MPV 9.3   9.9     9.8       NITRITE UA     Negative         nRBC 0   0     0       Blood, UA     3+         pH, UA     >8.0         Platelet Count 259   296     277       Potassium       4.3       hCG Qualitative, Urine     Positive         PROTEIN TOTAL       7.5       Protein, UA     Negative  Comment: Recommend a 24 hour urine protein or a urine   protein/creatinine ratio if globulin induced proteinuria is  clinically suspected.           RBC 3.84   3.95     4.22       RBC, UA     >100         RDW 12.5   12.5     12.5       Sodium       139       Spec Grav UA     1.020         Specimen Outdate       11/04/2024 23:59       Specimen UA     Urine, Clean Catch         Squam Epithel, UA     2         UROBILINOGEN UA     1.0         WBC, UA     2         WBC 8.76   9.42     10.42

## 2024-11-02 NOTE — DISCHARGE SUMMARY
[place] [facility] (3rd Fl)  Obstetrics  Discharge Summary      Patient Name: Nithin Hammonds  MRN: [id number]  Admission Date: 11/1/2024  Hospital Length of Stay: 0 days  Discharge Date and Time:  11/02/2024 9:55 AM  Attending Physician: Krysta Landry MD   Discharging Provider: Krysta Landry MD   Primary Care Provider: Fan James NP    HPI: Pt is a G1 at early gestational age who presented to the ER early this am with acute abdominal and pelvic pain.  She has been monitored as an outpatient for vaginal bleeding in early pregnancy and has had appropriate rise in hcg      Procedure(s) (LRB):  REMOVAL, ECTOPIC PREGNANCY, LAPAROSCOPIC (N/A)     Hospital Course:   Pt admitted from the ER for observation for possible ectopic pregnancy. Repeat H/H with small decline.  Patient reported feeling better since admission but abdominal examination concerning with rebounding and guarding.  Decision made to proceed with diagnostic laparoscopy with concern for ruptured ectopic pregnancy.     Consults (From admission, onward)          Status Ordering Provider     Inpatient consult to Gynecologic Oncology  Once        Provider:  Krysta Landry MD    Acknowledged KRYSTA LANDRY            Final Active Diagnoses:    Diagnosis Date Noted POA    PRINCIPAL PROBLEM:  Ruptured ectopic pregnancy [O00.90] 11/02/2024 Yes      Problems Resolved During this Admission:        Significant Diagnostic Studies: N/A      Immunizations       None            This patient has no babies on file.  Pending Diagnostic Studies:       None            Discharged Condition: good    Disposition: Home or Self Care    Follow Up:   Follow-up Information       Almita Beck MD Follow up in 2 week(s).    Specialty: Obstetrics and Gynecology  Contact information:  [address] [place] [id number] [phone]                           Patient Instructions:      Diet general     Wound care routine (specify)   Order  Comments: Wound care routine:   1. Keep clean and dry  2. Showers only  3. No creams/ointment  4. Remove soiled sterisrips     Call MD for:  temperature >100.4     Call MD for:  persistent nausea and vomiting     Call MD for:  severe uncontrolled pain     Call MD for:  difficulty breathing, headache or visual disturbances     Call MD for:  redness, tenderness, or signs of infection (pain, swelling, redness, odor or green/yellow discharge around incision site)     Call MD for:  hives     Call MD for:  persistent dizziness or light-headedness     Call MD for:  extreme fatigue     Call MD for:     Medications:  Current Discharge Medication List        START taking these medications    Details   HYDROcodone-acetaminophen (NORCO) 5-325 mg per tablet Take 1 tablet by mouth every 4 (four) hours as needed for Pain.  Qty: 20 tablet, Refills: 0    Comments: Quantity prescribed more than 7 day supply? No  Associated Diagnoses: Ruptured ectopic pregnancy      ibuprofen (ADVIL,MOTRIN) 800 MG tablet Take 1 tablet (800 mg total) by mouth every 8 (eight) hours as needed for Pain.  Qty: 30 tablet, Refills: 0           CONTINUE these medications which have NOT CHANGED    Details   atomoxetine (STRATTERA) 40 MG capsule Take 40 mg by mouth once daily.      dexmethylphenidate (FOCALIN XR) 40 mg 24 hr capsule Take 1 capsule by mouth every morning.             Krysta Landry MD  Obstetrics  [facility] (3rd Fl)

## 2024-11-02 NOTE — ED PROVIDER NOTES
Encounter Date: 2024       History     Chief Complaint   Patient presents with    Abdominal Pain     Patient reports lower abdominal pain and ongoing vaginal bleeding.  Seen here on 10/28/2024.  Has had serial beta HCG's with the last one today.     19-year-old female with no medical conditions presenting  (reports last menstrual period was ), with up trending beta hCG of 1000 today, presenting with lower abdominal cramping and mild vaginal bleeding consistent with her prior periods.  Denies fever, nausea, vomiting.  No dysuria.      Review of patient's allergies indicates:  No Known Allergies  Past Medical History:   Diagnosis Date    ADHD      Past Surgical History:   Procedure Laterality Date    THROAT SURGERY       Family History   Problem Relation Name Age of Onset    Breast cancer Maternal Grandmother      Hypertension Father      Hypertension Mother      Anemia Mother      Hypertension Brother      Hypertension Sister      Colon cancer Neg Hx      Ovarian cancer Neg Hx       Social History     Tobacco Use    Smoking status: Never    Smokeless tobacco: Never   Substance Use Topics    Alcohol use: Not Currently    Drug use: Never     Review of Systems   Constitutional:  Negative for fever.   HENT:  Negative for sore throat.    Respiratory:  Negative for shortness of breath.    Cardiovascular:  Negative for chest pain.   Gastrointestinal:  Positive for abdominal pain. Negative for diarrhea, nausea and vomiting.   Genitourinary:  Positive for vaginal bleeding. Negative for dysuria.   Musculoskeletal:  Negative for back pain.   Skin:  Negative for rash.   Neurological:  Negative for weakness.   Hematological:  Does not bruise/bleed easily.       Physical Exam     Initial Vitals [24 2311]   BP Pulse Resp Temp SpO2   121/69 90 16 98 °F (36.7 °C) 100 %      MAP       --         Physical Exam    Nursing note and vitals reviewed.  Constitutional: She appears well-developed.   HENT:   Head:  Normocephalic.   Eyes: Pupils are equal, round, and reactive to light.   Neck:   Normal range of motion.  Cardiovascular:            No murmur heard.  Pulmonary/Chest: No respiratory distress.   Abdominal: Abdomen is soft.   Mild lower abdominal tenderness to palpation.  No rebound or guarding.  No CVA tenderness bilaterally.   Musculoskeletal:         General: No edema.      Cervical back: Normal range of motion.     Neurological: She is alert.   Skin: Skin is warm.   Psychiatric: She has a normal mood and affect.         ED Course   Procedures  Labs Reviewed   URINALYSIS, REFLEX TO URINE CULTURE - Abnormal       Result Value    Specimen UA Urine, Clean Catch      Color, UA Yellow      Appearance, UA Cloudy (*)     pH, UA >8.0      Specific Gravity, UA 1.020      Protein, UA Negative      Glucose, UA Negative      Ketones, UA Negative      Bilirubin (UA) Negative      Occult Blood UA 3+ (*)     Nitrite, UA Negative      Urobilinogen, UA 1.0      Leukocytes, UA Negative      Narrative:     Specimen Source->Urine   PREGNANCY TEST, URINE RAPID - Abnormal    Preg Test, Ur Positive (*)     Narrative:     Specimen Source->Urine   CBC W/ AUTO DIFFERENTIAL - Abnormal    WBC 10.42      RBC 4.22      Hemoglobin 12.3      Hematocrit 37.5      MCV 89      MCH 29.1      MCHC 32.8      RDW 12.5      Platelets 277      MPV 9.8      Immature Granulocytes 0.3      Gran # (ANC) 5.3      Immature Grans (Abs) 0.03      Lymph # 3.4      Mono # 1.0      Eos # 0.6 (*)     Baso # 0.09      nRBC 0      Gran % 51.2      Lymph % 32.4      Mono % 9.2      Eosinophil % 6.0      Basophil % 0.9      Differential Method Automated     URINALYSIS MICROSCOPIC - Abnormal    RBC, UA >100 (*)     WBC, UA 2      Bacteria Occasional      Squam Epithel, UA 2      Amorphous, UA Moderate      Microscopic Comment SEE COMMENT      Narrative:     Specimen Source->Urine   COMPREHENSIVE METABOLIC PANEL    Sodium 139      Potassium 4.3      Chloride 104      CO2  27      Glucose 95      BUN 14      Creatinine 0.7      Calcium 9.5      Total Protein 7.5      Albumin 4.1      Total Bilirubin 0.5      Alkaline Phosphatase 64      AST 17      ALT 17      eGFR >60      Anion Gap 8     LIPASE    Lipase 20     TYPE & SCREEN    Specimen Outdate 2024 23:59            Imaging Results               US OB <14 Wks TransAbd & TransVag, Single Gestation (XPD) (Final result)  Result time 24 01:43:40      Final result by Brendan Murray MD (24 01:43:40)                   Impression:      No IUP demonstrated.  Findings consistent with pregnancy of unknown location and unknown viability.  Differential includes early IUP not yet visible, ectopic pregnancy and recent .  Recommend follow-up serial beta hCG and follow-up transvaginal ultrasound.    Moderate volume free fluid in the pelvis primarily in the posterior cul-de-sac.  Fluid contains low-level echoes may represent hemoperitoneum.  In the setting of potential ectopic pregnancy, the possibly of ruptured ectopic should be considered.  Consider correlation with culdocentesis, as clinically indicated.    Bilateral ovaries appear unremarkable.  No separate extra ovarian adnexal masses demonstrated.    This report was flagged in Epic as abnormal.      Electronically signed by: Brendan Murray MD  Date:    2024  Time:    01:43               Narrative:    EXAMINATION:  US OB <14 WEEKS, TRANSABDOM & TRANSVAG, SINGLE GESTATION (XPD)    CLINICAL HISTORY:  vag bleeding;    TECHNIQUE:  Transabdominal sonography of the pelvis was performed, followed by transvaginal sonography to better evaluate the uterus and ovaries.    FINDINGS:  Uterus:    Size: 6.9 x 3.4 x 3.9 cm    No IUP demonstrated.    Endometrium: Normal in this pre menopausal patient, measuring 4.9 mm.    Right ovary:    Size: 3.4 x 1.7 x 2.3 cm    Appearance: Normal    Vascular flow: Normal.    Left ovary:    Size: 3.0 x 2.3 x 1.9 cm    Appearance:  Normal    Vascular Flow: Normal.    Free Fluid:    Moderate volume complex free fluid in the pelvis, primarily in the posterior cul-de-sac.  Fluid contains low-level echoes and may represent hemoperitoneum.                                       Medications - No data to display  Medical Decision Making  DDX: Threatened  vs. early pregnancy vs. ectopic pregnancy.  DX: CBC, T/S, HCG. Udip, Upreg. US.   TX: Analgesia PRN. Treatment/consult as indicated by studies.  DISPO: Likely discharge to follow up with GYN in 2 days with supportive care recommendations and RTED precautions.          Amount and/or Complexity of Data Reviewed  Labs: ordered.  Radiology: ordered.               ED Course as of 24 180   Sat 2024   0139 Spoke with Dr. Landry who recommends admission and repeat CBCs.  Patient reports that her pain is unchanged from her arrival, and she has mild lower abdominal tenderness to palpation.  Vitals remained stable.  Patient understands that she was being observed, with the possibility of surgical intervention if her hemoglobin drops, or her pain changes in character or increases and strength. [NB]      ED Course User Index  [NB] Ravi Cadet MD                           Clinical Impression:  Final diagnoses:  [O20.9] Vaginal bleeding in pregnancy, first trimester          ED Disposition Condition    Observation Stable                Ravi Cadet MD  24 [id number]       Ravi Cadet MD  24 180

## 2024-11-02 NOTE — ASSESSMENT & PLAN NOTE
Discussed suspicion of ruptured ectopic pregnancy.  Will proceed with diagnostic laparoscopy.  Patient and family understand and agree to proceed.     Patient cleared for Anesthesia: General    Anesthesia/Surgery risks, benefits, and alternative options discussed and understood by patient/fa

## 2024-11-02 NOTE — HOSPITAL COURSE
Pt admitted from the ER for observation for possible ectopic pregnancy. Repeat H/H with small decline.  Patient reported feeling better since admission but abdominal examination concerning with rebounding and guarding.  Decision made to proceed with diagnostic laparoscopy with concern for ruptured ectopic pregnancy.

## 2024-11-02 NOTE — H&P
[place] Surg (3rd Fl)  Obstetrics  History & Physical    Patient Name: Nithin Hammonds  MRN: [id number]  Admission Date: 2024  Primary Care Provider: Fan James NP    Subjective:     Principal Problem:Ruptured ectopic pregnancy    History of Present Illness:  Pt is a G1 at early gestational age who presented to the ER early this am with acute abdominal and pelvic pain.  She has been monitored as an outpatient for vaginal bleeding in early pregnancy and has had appropriate rise in hcg    Obstetric HPI:  Patient reports abdominal pain is present but with some improvement.  Yes vaginal bleeding     OB History    Para Term  AB Living   2 0 0 0 1 0   SAB IAB Ectopic Multiple Live Births   1 0 0 0 0      # Outcome Date GA Lbr Sebastian/2nd Weight Sex Type Anes PTL Lv   2 Current            1 SAB 11/15/23             Past Medical History:   Diagnosis Date    ADHD      Past Surgical History:   Procedure Laterality Date    THROAT SURGERY         PTA Medications   Medication Sig    atomoxetine (STRATTERA) 40 MG capsule Take 40 mg by mouth once daily.    dexmethylphenidate (FOCALIN XR) 40 mg 24 hr capsule Take 1 capsule by mouth every morning.       Review of patient's allergies indicates:  No Known Allergies     Family History       Problem Relation (Age of Onset)    Anemia Mother    Breast cancer Maternal Grandmother    Hypertension Father, Mother, Brother, Sister          Tobacco Use    Smoking status: Never    Smokeless tobacco: Never   Substance and Sexual Activity    Alcohol use: Not Currently    Drug use: Never    Sexual activity: Yes     Partners: Male     Birth control/protection: None     Review of Systems   Constitutional:  Negative for activity change, appetite change, chills, diaphoresis, fatigue and fever.   Eyes:  Negative for visual disturbance.   Respiratory:  Negative for cough, shortness of breath and wheezing.    Cardiovascular:  Negative for chest pain and palpitations.    Gastrointestinal:  Positive for abdominal pain. Negative for constipation, diarrhea, nausea and vomiting.   Genitourinary:  Positive for vaginal bleeding. Negative for dysuria, genital sores, pelvic pain, urgency, vaginal discharge, vaginal pain and vaginal odor.   Musculoskeletal:  Negative for back pain, joint swelling and myalgias.   Integumentary:  Negative for rash.   Neurological:  Negative for seizures, syncope, numbness and headaches.   Hematological:  Negative for adenopathy. Does not bruise/bleed easily.   Psychiatric/Behavioral:  Negative for depression. The patient is not nervous/anxious.       Objective:     Vital Signs (Most Recent):  Temp: 98.9 °F (37.2 °C) (11/02/24 0755)  Pulse: 108 (11/02/24 0755)  Resp: 16 (11/02/24 0755)  BP: 131/68 (11/02/24 0755)  SpO2: 98 % (11/02/24 0757) Vital Signs (24h Range):  Temp:  [97.8 °F (36.6 °C)-98.9 °F (37.2 °C)] 98.9 °F (37.2 °C)  Pulse:  [] 108  Resp:  [16-20] 16  SpO2:  [98 %-100 %] 98 %  BP: (121-138)/(66-80) 131/68     Weight: 74.9 kg (165 lb 2 oz)  Body mass index is 30.2 kg/m².       Physical Exam:   Constitutional: She is oriented to person, place, and time. She appears well-developed and well-nourished. No distress.    HENT:   Head: Normocephalic and atraumatic.    Eyes: Conjunctivae and EOM are normal.      Pulmonary/Chest: Effort normal. No respiratory distress.        Abdominal: There is abdominal tenderness in the right lower quadrant and left lower quadrant. There is rebound and guarding.             Musculoskeletal: Normal range of motion.       Neurological: She is alert and oriented to person, place, and time.    Skin: Skin is warm and dry.    Psychiatric: She has a normal mood and affect. Her behavior is normal. Judgment and thought content normal.          Significant Labs:  Lab Results   Component Value Date    GROUPTRH A POS 10/31/2023    HEPBSAG Non-reactive 10/31/2023       I have personallly reviewed all pertinent lab results from  the last 24 hours.  Recent Lab Results         11/02/24  0818   11/02/24  0426   11/01/24  2356   11/01/24  2353        Albumin       4.1       ALP       64       ALT       17       Amorphous, UA     Moderate         Anion Gap       8       Appearance, UA     Cloudy         AST       17       Bacteria, UA     Occasional         Baso # 0.05   0.06     0.09       Basophil % 0.6   0.6     0.9       Bilirubin (UA)     Negative         BILIRUBIN TOTAL       0.5  Comment: For infants and newborns, interpretation of results should be based  on gestational age, weight and in agreement with clinical  observations.    Premature Infant recommended reference ranges:  Up to 24 hours.............<8.0 mg/dL  Up to 48 hours............<12.0 mg/dL  3-5 days..................<15.0 mg/dL  6-29 days.................<15.0 mg/dL         BUN       14       Calcium       9.5       Chloride       104       CO2       27       Color, UA     Yellow         Creatinine       0.7       Differential Method Automated   Automated     Automated       eGFR       >60       Eos # 0.0   0.3     0.6       Eos % 0.5   3.1     6.0       Glucose       95       Glucose, UA     Negative         Gran # (ANC) 6.6   6.2     5.3       Gran % 75.6   65.4     51.2       Hematocrit 34.1   35.1     37.5       Hemoglobin 11.4   11.7     12.3       Immature Grans (Abs) 0.02  Comment: Mild elevation in immature granulocytes is non specific and   can be seen in a variety of conditions including stress response,   acute inflammation, trauma and pregnancy. Correlation with other   laboratory and clinical findings is essential.     0.04  Comment: Mild elevation in immature granulocytes is non specific and   can be seen in a variety of conditions including stress response,   acute inflammation, trauma and pregnancy. Correlation with other   laboratory and clinical findings is essential.       0.03  Comment: Mild elevation in immature granulocytes is non specific and   can be  seen in a variety of conditions including stress response,   acute inflammation, trauma and pregnancy. Correlation with other   laboratory and clinical findings is essential.         Immature Granulocytes 0.2   0.4     0.3       Ketones, UA     Negative         Leukocyte Esterase, UA     Negative         Lipase       20       Lymph # 1.4   2.3     3.4       Lymph % 16.1   23.9     32.4       MCH 29.7   29.6     29.1       MCHC 33.4   33.3     32.8       MCV 89   89     89       Microscopic Comment     SEE COMMENT  Comment: Other formed elements not mentioned in the report are not   present in the microscopic examination.            Mono # 0.6   0.6     1.0       Mono % 7.0   6.6     9.2       MPV 9.3   9.9     9.8       NITRITE UA     Negative         nRBC 0   0     0       Blood, UA     3+         pH, UA     >8.0         Platelet Count 259   296     277       Potassium       4.3       hCG Qualitative, Urine     Positive         PROTEIN TOTAL       7.5       Protein, UA     Negative  Comment: Recommend a 24 hour urine protein or a urine   protein/creatinine ratio if globulin induced proteinuria is  clinically suspected.           RBC 3.84   3.95     4.22       RBC, UA     >100         RDW 12.5   12.5     12.5       Sodium       139       Spec Grav UA     1.020         Specimen Outdate       2024 23:59       Specimen UA     Urine, Clean Catch         Squam Epithel, UA     2         UROBILINOGEN UA     1.0         WBC, UA     2         WBC 8.76   9.42     10.42             Assessment/Plan:     19 y.o. female  at Unknown for:    * Ruptured ectopic pregnancy  Discussed suspicion of ruptured ectopic pregnancy.  Will proceed with diagnostic laparoscopy.  Patient and family understand and agree to proceed.     Patient cleared for Anesthesia: General    Anesthesia/Surgery risks, benefits, and alternative options discussed and understood by patient/fa          Krysta Landry MD  Obstetrics  [facility]  Surg (3rd Fl)

## 2024-11-02 NOTE — NURSING
Patient meets all d/c criteria at this time. Pt voiding, tolerating po intake, pain controlled, minimal bleeding, tolerating diet without nausea or vomiting. Lap sites clean, dry and intact. VSS.     Discharge education completed with patient, patient's mother, and patient's significant other. All questions answered.     Patient educated to follow up with MD in 2 weeks

## 2024-11-02 NOTE — OP NOTE
Date: 11/02/2024    Procedure: Laparoscopic attempted left salpingostomy, with left salpingectomy; removal of 300cc hemoperitoneum     Surgeon: rKysta Landry MD    Assistant: none    Pre-op Dx: suspected ruptured ectopic pregnancy    Post-op Dx: left ruptured ectopic pregnancy    Anesthesia: GETA    EBL: 20 cc; 300 cc hemoperitoneum    DVT prophylaxis: Bilateral sequential compression devices    Perioperative antibiotics: NA    Specimen: left fallopian tube with ectopic    Operative Findings:  Normal appearing uterus, left adnexa dilated with ectopic pregnancy; 300 cc hemoperitoneum, and normal bowels and appendix    Complications: none    Brief HPI: Pt is a G1 at early gestational age who presented to the ER early this am with acute abdominal and pelvic pain. She has been monitored as an outpatient for vaginal bleeding in early pregnancy and has had appropriate rise in hcg.   Ultrasound with moderate fluid in the pelvis. Examination concerning for ruptured ectopic pregnancy.       PROCEDURE IN DETAIL:     The patient was  transferred to the Operating Room, where general endotracheal anesthesia was administered without complication. She was placed in the supine position.  The bladder was drained. The patient's abdomen was prepped in the normal sterile fashion. The patient was draped.  A 5 mm skin incision was made in the patient's infraumbilical region. Veress needle was inserted into this location with proper intraperitoneal placement, confirmed with the saline drop test and low intraabdominal pressure.  The abdomen was   insufflated. The 5 mm infraumbilical trocar was inserted under direct laparoscopic visualization. Next, two 5mm skin incisions were made in the patient's lower lateral abdomen bilaterally. 5 mm trocars were placed in the direct laparoscopic visualization. The patient was placed in steep Trendelenburg position. The abdomen and pelvis were surveyed with the above noted findings.  Blood was  suctioned from the pelvis and the left tube was noted to have an ectopic pregnancy.  The tube was grasped and an incision was made with the Voyant over the ectopic.  The pregnancy was removed, but it was uncertain if all tissue was removed as the tube remained dilated.  There was still bleeding from the left tube, so the decision was made to proceed with left salpingectomy. The left tube was excised entirely along the mesosalpinx with the Voyant.  The RLQ incision was extended and an 11mm trochar was inserted.  The tube was placed in an endo-catch bag and the specimen was removed. The tube was removed and sent to pathology.  The abdomen and pelvis were irrigated and good hemostasis noted.  The operative field was hemostatic.  Next, the ports were removed under direct laparoscopic visualization with excellent hemostasis noted. RLQ skin incisions was closed using a #3-0 Vicryl.  Dermoplast was placed on all skin incisions. All instruments were removed. Sponge and instrument count was correct x2. The patient tolerated the procedure well and was transferred to recovery in stable condition.

## 2024-11-02 NOTE — HPI
Pt is a G1 at early gestational age who presented to the ER early this am with acute abdominal and pelvic pain.  She has been monitored as an outpatient for vaginal bleeding in early pregnancy and has had appropriate rise in hcg

## 2024-11-02 NOTE — PLAN OF CARE
Problem:  Fall Injury Risk  Goal: Absence of Fall, Infant Drop and Related Injury  Outcome: Progressing     Problem: Adult Inpatient Plan of Care  Goal: Plan of Care Review  Outcome: Progressing  Flowsheets (Taken 2024 050)  Plan of Care Reviewed With:   patient   family  Goal: Patient-Specific Goal (Individualized)  Outcome: Progressing  Goal: Absence of Hospital-Acquired Illness or Injury  Outcome: Progressing  Goal: Optimal Comfort and Wellbeing  Outcome: Progressing  Goal: Readiness for Transition of Care  Outcome: Progressing

## 2024-11-04 LAB
ABO + RH BLD: NORMAL
BLD GP AB SCN CELLS X3 SERPL QL: NORMAL
SPECIMEN OUTDATE: NORMAL

## 2024-11-07 LAB
COMMENT: NORMAL
FINAL PATHOLOGIC DIAGNOSIS: NORMAL
GROSS: NORMAL
Lab: NORMAL

## 2024-11-18 ENCOUNTER — POSTPARTUM VISIT (OUTPATIENT)
Dept: OBSTETRICS AND GYNECOLOGY | Facility: CLINIC | Age: 19
End: 2024-11-18
Payer: COMMERCIAL

## 2024-11-18 VITALS
SYSTOLIC BLOOD PRESSURE: 126 MMHG | BODY MASS INDEX: 31 KG/M2 | HEIGHT: 62 IN | WEIGHT: 168.44 LBS | HEART RATE: 98 BPM | DIASTOLIC BLOOD PRESSURE: 86 MMHG

## 2024-11-18 DIAGNOSIS — O00.90 RUPTURED ECTOPIC PREGNANCY: Primary | ICD-10-CM

## 2024-11-18 DIAGNOSIS — N91.2 AMENORRHEA: ICD-10-CM

## 2024-11-18 PROCEDURE — 99999 PR PBB SHADOW E&M-EST. PATIENT-LVL III: CPT | Mod: PBBFAC,,, | Performed by: STUDENT IN AN ORGANIZED HEALTH CARE EDUCATION/TRAINING PROGRAM

## 2024-11-18 PROCEDURE — 81025 URINE PREGNANCY TEST: CPT | Mod: S$GLB,,, | Performed by: STUDENT IN AN ORGANIZED HEALTH CARE EDUCATION/TRAINING PROGRAM

## 2024-11-18 PROCEDURE — 99024 POSTOP FOLLOW-UP VISIT: CPT | Mod: S$GLB,,, | Performed by: STUDENT IN AN ORGANIZED HEALTH CARE EDUCATION/TRAINING PROGRAM

## 2024-11-18 NOTE — PROGRESS NOTES
"Obstetrics and Gynecology  Post-operative Progress Note    Chief Complaint   Patient presents with    Post-op Evaluation    Postpartum Care       Nithin Hammonds is a 19 y.o. female  post-op from a laparoscopic salpingectomy on 24.  Patient is Doing well postoperatively.      The pathology revealed:  Final Pathologic Diagnosis 1. Left fallopian tube with products of conception, salpingectomy:  Ruptured fallopian tube with edema, hemorrhage, and blood clot  No chorionic villi identified       Past Medical History:   Diagnosis Date    ADHD      Past Surgical History:   Procedure Laterality Date    SALPINGECTOMY      THROAT SURGERY       Family History   Problem Relation Name Age of Onset    Breast cancer Maternal Grandmother      Hypertension Father      Hypertension Mother      Anemia Mother      Hypertension Brother      Hypertension Sister      Colon cancer Neg Hx      Ovarian cancer Neg Hx       Social History     Tobacco Use    Smoking status: Never    Smokeless tobacco: Never   Substance Use Topics    Alcohol use: Not Currently    Drug use: Never     OB History    Para Term  AB Living   2       1     SAB IAB Ectopic Multiple Live Births   1              # Outcome Date GA Lbr Sebastian/2nd Weight Sex Type Anes PTL Lv   2 Current            1 SAB 11/15/23               /86   Pulse 98   Ht 5' 2" (1.575 m)   Wt 76.4 kg (168 lb 6.9 oz)   LMP 2024 (Exact Date)   BMI 30.81 kg/m²     ROS:  GENERAL: No fever, chills, fatigability or weight loss.  VULVAR: No pain, no lesions and no itching.  VAGINAL: No relaxation, no itching, no discharge, no abnormal bleeding and no lesions.  ABDOMEN: No abdominal pain. Denies nausea. Denies vomiting. No diarrhea. No constipation  BREAST: Denies pain. No lumps. No discharge.  URINARY: No incontinence, no nocturia, no frequency and no dysuria.  CARDIOVASCULAR: No chest pain. No shortness of breath. No leg cramps.  NEUROLOGICAL: No headaches. No " "vision changes.    PE:   /86   Pulse 98   Ht 5' 2" (1.575 m)   Wt 76.4 kg (168 lb 6.9 oz)   LMP 09/14/2024 (Exact Date)   BMI 30.81 kg/m²   General appearance: alert, appears stated age, and cooperative  Abdomen: soft, non-tender; bowel sounds normal; no masses,  no organomegaly and incisions c/d/i  Pelvic: deferred  Neurologic: Mental status: Alert, oriented, thought content appropriate      ASSESSMENT:    1. Ruptured ectopic pregnancy             PLAN:  Patient can return to normal activity   "

## 2024-11-19 ENCOUNTER — TELEPHONE (OUTPATIENT)
Dept: OBSTETRICS AND GYNECOLOGY | Facility: CLINIC | Age: 19
End: 2024-11-19
Payer: COMMERCIAL

## 2024-11-19 LAB
B-HCG UR QL: POSITIVE
CTP QC/QA: YES

## 2024-11-19 NOTE — TELEPHONE ENCOUNTER
----- Message from Med Assistant Wells sent at 11/19/2024 12:06 PM CST -----  Contact: self  Nithin Hammonds  MRN: [id number]  Home Phone      Not on file.  Work Phone      Not on file.  Mobile          [phone]    Patient Care Team:  Fan James NP as PCP - General (Family Medicine)  Manuel Goyal MD as Obstetrician (Obstetrics)  Katey Kirkpatrick MD as Consulting Physician (Obstetrics and Gynecology)  Almita Beck MD as Consulting Physician (Obstetrics and Gynecology)   OB? No  What phone number can you be reached at? [phone]  Message: Would like to speak to nurse regarding getting orders to have lab work done.

## 2024-11-19 NOTE — TELEPHONE ENCOUNTER
----- Message from  sent at 11/19/2024 11:55 AM CST -----  Contact: Self  Nithin Hammonds  MRN: [id number]  Home Phone      Not on file.  Work Phone      Not on file.  Mobile          [phone]    Patient Care Team:  Fan James NP as PCP - General (Family Medicine)  Manuel Goyal MD as Obstetrician (Obstetrics)  Katey Kirkpatrick MD as Consulting Physician (Obstetrics and Gynecology)  Almita Beck MD as Consulting Physician (Obstetrics and Gynecology)   OB? Yes, Unknown  What phone number can you be reached at? [phone]  Message: wants to ask about pregnancy

## 2024-11-21 ENCOUNTER — LAB VISIT (OUTPATIENT)
Dept: LAB | Facility: HOSPITAL | Age: 19
End: 2024-11-21
Attending: STUDENT IN AN ORGANIZED HEALTH CARE EDUCATION/TRAINING PROGRAM
Payer: COMMERCIAL

## 2024-11-21 DIAGNOSIS — O00.90 RUPTURED ECTOPIC PREGNANCY: Primary | ICD-10-CM

## 2024-11-21 DIAGNOSIS — O00.90 RUPTURED ECTOPIC PREGNANCY: ICD-10-CM

## 2024-11-21 LAB — HCG INTACT+B SERPL-ACNC: 13 MIU/ML

## 2024-11-21 PROCEDURE — 84702 CHORIONIC GONADOTROPIN TEST: CPT | Performed by: STUDENT IN AN ORGANIZED HEALTH CARE EDUCATION/TRAINING PROGRAM

## 2024-11-21 PROCEDURE — 36415 COLL VENOUS BLD VENIPUNCTURE: CPT | Performed by: STUDENT IN AN ORGANIZED HEALTH CARE EDUCATION/TRAINING PROGRAM

## 2024-11-24 ENCOUNTER — LAB VISIT (OUTPATIENT)
Dept: LAB | Facility: HOSPITAL | Age: 19
End: 2024-11-24
Attending: STUDENT IN AN ORGANIZED HEALTH CARE EDUCATION/TRAINING PROGRAM
Payer: COMMERCIAL

## 2024-11-24 DIAGNOSIS — O00.90 RUPTURED ECTOPIC PREGNANCY: ICD-10-CM

## 2024-11-24 LAB — HCG INTACT+B SERPL-ACNC: 10 MIU/ML

## 2024-11-24 PROCEDURE — 36415 COLL VENOUS BLD VENIPUNCTURE: CPT | Performed by: STUDENT IN AN ORGANIZED HEALTH CARE EDUCATION/TRAINING PROGRAM

## 2024-11-24 PROCEDURE — 84702 CHORIONIC GONADOTROPIN TEST: CPT | Performed by: STUDENT IN AN ORGANIZED HEALTH CARE EDUCATION/TRAINING PROGRAM

## 2024-11-25 ENCOUNTER — APPOINTMENT (OUTPATIENT)
Dept: RADIOLOGY | Facility: CLINIC | Age: 19
End: 2024-11-25
Attending: NURSE PRACTITIONER
Payer: COMMERCIAL

## 2024-11-25 ENCOUNTER — OFFICE VISIT (OUTPATIENT)
Dept: OBSTETRICS AND GYNECOLOGY | Facility: CLINIC | Age: 19
End: 2024-11-25
Payer: COMMERCIAL

## 2024-11-25 ENCOUNTER — TELEPHONE (OUTPATIENT)
Dept: OBSTETRICS AND GYNECOLOGY | Facility: CLINIC | Age: 19
End: 2024-11-25
Payer: COMMERCIAL

## 2024-11-25 VITALS
DIASTOLIC BLOOD PRESSURE: 78 MMHG | HEIGHT: 62 IN | HEART RATE: 71 BPM | SYSTOLIC BLOOD PRESSURE: 130 MMHG | WEIGHT: 163.13 LBS | BODY MASS INDEX: 30.02 KG/M2

## 2024-11-25 DIAGNOSIS — R10.2 PELVIC PAIN: Primary | ICD-10-CM

## 2024-11-25 DIAGNOSIS — R10.2 PELVIC PAIN: ICD-10-CM

## 2024-11-25 DIAGNOSIS — O00.90 RUPTURED ECTOPIC PREGNANCY: Primary | ICD-10-CM

## 2024-11-25 LAB
BILIRUB SERPL-MCNC: NEGATIVE MG/DL
BLOOD URINE, POC: NEGATIVE
CLARITY, POC UA: CLEAR
COLOR, POC UA: YELLOW
GLUCOSE UR QL STRIP: NEGATIVE
KETONES UR QL STRIP: NEGATIVE
LEUKOCYTE ESTERASE URINE, POC: NEGATIVE
NITRITE, POC UA: NEGATIVE
PH, POC UA: 7
PROTEIN, POC: NEGATIVE
SPECIFIC GRAVITY, POC UA: 1.02
UROBILINOGEN, POC UA: NORMAL

## 2024-11-25 PROCEDURE — 76856 US EXAM PELVIC COMPLETE: CPT | Mod: TC

## 2024-11-25 PROCEDURE — 76830 TRANSVAGINAL US NON-OB: CPT | Mod: 26,,, | Performed by: RADIOLOGY

## 2024-11-25 PROCEDURE — 87086 URINE CULTURE/COLONY COUNT: CPT | Performed by: STUDENT IN AN ORGANIZED HEALTH CARE EDUCATION/TRAINING PROGRAM

## 2024-11-25 PROCEDURE — 76856 US EXAM PELVIC COMPLETE: CPT | Mod: 26,,, | Performed by: RADIOLOGY

## 2024-11-25 PROCEDURE — 99999 PR PBB SHADOW E&M-EST. PATIENT-LVL III: CPT | Mod: PBBFAC,,, | Performed by: STUDENT IN AN ORGANIZED HEALTH CARE EDUCATION/TRAINING PROGRAM

## 2024-11-25 RX ORDER — KETOROLAC TROMETHAMINE 10 MG/1
10 TABLET, FILM COATED ORAL EVERY 6 HOURS
Qty: 20 TABLET | Refills: 0 | Status: SHIPPED | OUTPATIENT
Start: 2024-11-25 | End: 2024-11-30

## 2024-11-25 NOTE — PROGRESS NOTES
Subjective:       Patient ID: Nithin Hammonds is a 19 y.o. female.    Chief Complaint:  Post-op Evaluation (Patient reports pelvic pain following left salpingectomy 2024 for ruptured ectopic )      History of Present Illness  Patient is a 20 yo  presenting with right sided pelvic pain. She reports that the pain started around when she went back to work last week. She denies heavy lifting or trauma to the abdomin during that time. She does report some discomfort with urination. She describes the pain as sharp and cannot lay on that side.           GYN & OB History  Patient's last menstrual period was 2024 (exact date).   Date of Last Pap: No result found    OB History    Para Term  AB Living   2       2     SAB IAB Ectopic Multiple Live Births   1   1          # Outcome Date GA Lbr Sebastian/2nd Weight Sex Type Anes PTL Lv   2 Ectopic            1 SAB 11/15/23               Review of Systems  Review of Systems   Constitutional:  Negative for chills, fatigue, fever and unexpected weight change.   HENT:  Negative for congestion, sinus pain and sore throat.    Eyes:  Negative for visual disturbance.   Respiratory:  Negative for cough, chest tightness and shortness of breath.    Cardiovascular:  Negative for chest pain and palpitations.   Gastrointestinal:  Negative for abdominal pain, constipation, diarrhea, nausea and vomiting.   Genitourinary:  Positive for pelvic pain. Negative for menstrual problem, vaginal bleeding, vaginal discharge and vaginal pain.   Musculoskeletal:  Negative for myalgias.   Skin:  Negative for color change, pallor and rash.   Neurological:  Negative for dizziness, light-headedness and headaches.   Psychiatric/Behavioral:  The patient is not nervous/anxious.            Objective:    Physical Exam:   Constitutional: She is oriented to person, place, and time. She appears well-developed and well-nourished. No distress.    HENT:   Head: Normocephalic and  atraumatic.      Cardiovascular:  Normal rate.             Pulmonary/Chest: Effort normal. No respiratory distress.        Abdominal: Soft. Abdominal incision: RLQ. There is abdominal tenderness.                 Neurological: She is alert and oriented to person, place, and time.    Skin: Skin is warm and dry. No erythema.    Psychiatric: She has a normal mood and affect. Her behavior is normal. Judgment and thought content normal.          Assessment:        1. Ruptured ectopic pregnancy    2. Pelvic pain                Plan:      Nithin was seen today for post-op evaluation.    Diagnoses and all orders for this visit:    Ruptured ectopic pregnancy    Pelvic pain  -     US Pelvis Comp with Transvag NON-OB (xpd; Future  -     POCT URINE DIPSTICK WITHOUT MICROSCOPE  -     Urine culture  -     ketorolac (TORADOL) 10 mg tablet; Take 1 tablet (10 mg total) by mouth every 6 (six) hours. for 5 days

## 2024-11-25 NOTE — TELEPHONE ENCOUNTER
----- Message from Jory sent at 11/25/2024  8:06 AM CST -----  Type:  Needs Medical Advice    Who Called: Pt  Symptoms (please be specific): Pain where tube was removed  Would the patient rather a call back or a response via [facility]? Call  Best Call Back Number:  [phone]  Additional Information: Pt states she is having pain where tube was removed. Pt would like to speak to someone in office

## 2024-11-25 NOTE — TELEPHONE ENCOUNTER
Patient reports Lt sided pelvic pain that she rates a 5/10 on the pain scale. Her pain does not worsen with movement. She said that she lifted some coins at work. Reports no vaginal bleeding and that she did not take anything to relieve the pain because she was not sure what she can take. Patient added to the scheduled today. She was given her appointment information and she verbalized understanding.

## 2024-11-26 LAB
BACTERIA UR CULT: NORMAL
BACTERIA UR CULT: NORMAL

## 2024-11-27 ENCOUNTER — HOSPITAL ENCOUNTER (EMERGENCY)
Facility: HOSPITAL | Age: 19
Discharge: HOME | End: 2024-11-27
Attending: SURGERY
Payer: COMMERCIAL

## 2024-11-27 VITALS
HEART RATE: 87 BPM | WEIGHT: 161.5 LBS | OXYGEN SATURATION: 100 % | DIASTOLIC BLOOD PRESSURE: 65 MMHG | SYSTOLIC BLOOD PRESSURE: 112 MMHG | TEMPERATURE: 99 F | BODY MASS INDEX: 29.54 KG/M2 | RESPIRATION RATE: 20 BRPM

## 2024-11-27 DIAGNOSIS — N83.201 CYST OF RIGHT OVARY: Primary | ICD-10-CM

## 2024-11-27 LAB
ALBUMIN SERPL BCP-MCNC: 4.3 G/DL (ref 3.5–5.2)
ALP SERPL-CCNC: 66 U/L (ref 40–150)
ALT SERPL W/O P-5'-P-CCNC: 18 U/L (ref 10–44)
ANION GAP SERPL CALC-SCNC: 11 MMOL/L (ref 8–16)
AST SERPL-CCNC: 14 U/L (ref 10–40)
BACTERIA #/AREA URNS HPF: ABNORMAL /HPF
BASOPHILS # BLD AUTO: 0.06 K/UL (ref 0–0.2)
BASOPHILS NFR BLD: 0.8 % (ref 0–1.9)
BILIRUB SERPL-MCNC: 0.5 MG/DL (ref 0.1–1)
BILIRUB UR QL STRIP: NEGATIVE
BUN SERPL-MCNC: 11 MG/DL (ref 6–20)
CALCIUM SERPL-MCNC: 9.6 MG/DL (ref 8.7–10.5)
CHLORIDE SERPL-SCNC: 104 MMOL/L (ref 95–110)
CLARITY UR: CLEAR
CO2 SERPL-SCNC: 23 MMOL/L (ref 23–29)
COLOR UR: YELLOW
CREAT SERPL-MCNC: 0.7 MG/DL (ref 0.5–1.4)
DIFFERENTIAL METHOD BLD: ABNORMAL
EOSINOPHIL # BLD AUTO: 0.2 K/UL (ref 0–0.5)
EOSINOPHIL NFR BLD: 2.5 % (ref 0–8)
ERYTHROCYTE [DISTWIDTH] IN BLOOD BY AUTOMATED COUNT: 12.2 % (ref 11.5–14.5)
EST. GFR  (NO RACE VARIABLE): >60 ML/MIN/1.73 M^2
GLUCOSE SERPL-MCNC: 94 MG/DL (ref 70–110)
GLUCOSE UR QL STRIP: NEGATIVE
HCT VFR BLD AUTO: 36.9 % (ref 37–48.5)
HGB BLD-MCNC: 11.9 G/DL (ref 12–16)
HGB UR QL STRIP: NEGATIVE
HYALINE CASTS #/AREA URNS LPF: 0 /LPF
IMM GRANULOCYTES # BLD AUTO: 0.01 K/UL (ref 0–0.04)
IMM GRANULOCYTES NFR BLD AUTO: 0.1 % (ref 0–0.5)
KETONES UR QL STRIP: ABNORMAL
LEUKOCYTE ESTERASE UR QL STRIP: ABNORMAL
LIPASE SERPL-CCNC: 16 U/L (ref 4–60)
LYMPHOCYTES # BLD AUTO: 1.9 K/UL (ref 1–4.8)
LYMPHOCYTES NFR BLD: 25.3 % (ref 18–48)
MCH RBC QN AUTO: 27.8 PG (ref 27–31)
MCHC RBC AUTO-ENTMCNC: 32.2 G/DL (ref 32–36)
MCV RBC AUTO: 86 FL (ref 82–98)
MICROSCOPIC COMMENT: ABNORMAL
MONOCYTES # BLD AUTO: 0.6 K/UL (ref 0.3–1)
MONOCYTES NFR BLD: 7.7 % (ref 4–15)
NEUTROPHILS # BLD AUTO: 4.8 K/UL (ref 1.8–7.7)
NEUTROPHILS NFR BLD: 63.6 % (ref 38–73)
NITRITE UR QL STRIP: NEGATIVE
NRBC BLD-RTO: 0 /100 WBC
PH UR STRIP: 6 [PH] (ref 5–8)
PLATELET # BLD AUTO: 265 K/UL (ref 150–450)
PMV BLD AUTO: 9.7 FL (ref 9.2–12.9)
POTASSIUM SERPL-SCNC: 4.2 MMOL/L (ref 3.5–5.1)
PROT SERPL-MCNC: 7.4 G/DL (ref 6–8.4)
PROT UR QL STRIP: ABNORMAL
RBC # BLD AUTO: 4.28 M/UL (ref 4–5.4)
RBC #/AREA URNS HPF: 1 /HPF (ref 0–4)
SODIUM SERPL-SCNC: 138 MMOL/L (ref 136–145)
SP GR UR STRIP: 1.02 (ref 1–1.03)
SQUAMOUS #/AREA URNS HPF: 5 /HPF
URN SPEC COLLECT METH UR: ABNORMAL
UROBILINOGEN UR STRIP-ACNC: NEGATIVE EU/DL
WBC # BLD AUTO: 7.62 K/UL (ref 3.9–12.7)
WBC #/AREA URNS HPF: 4 /HPF (ref 0–5)
YEAST URNS QL MICRO: ABNORMAL

## 2024-11-27 PROCEDURE — 99285 EMERGENCY DEPT VISIT HI MDM: CPT | Mod: 25

## 2024-11-27 PROCEDURE — 83690 ASSAY OF LIPASE: CPT | Performed by: SURGERY

## 2024-11-27 PROCEDURE — 85025 COMPLETE CBC W/AUTO DIFF WBC: CPT | Performed by: SURGERY

## 2024-11-27 PROCEDURE — 25000003 PHARM REV CODE 250: Performed by: SURGERY

## 2024-11-27 PROCEDURE — 81000 URINALYSIS NONAUTO W/SCOPE: CPT | Performed by: SURGERY

## 2024-11-27 PROCEDURE — 96360 HYDRATION IV INFUSION INIT: CPT | Mod: 59

## 2024-11-27 PROCEDURE — 25500020 PHARM REV CODE 255: Performed by: SURGERY

## 2024-11-27 PROCEDURE — 36415 COLL VENOUS BLD VENIPUNCTURE: CPT | Performed by: SURGERY

## 2024-11-27 PROCEDURE — 80053 COMPREHEN METABOLIC PANEL: CPT | Performed by: SURGERY

## 2024-11-27 RX ORDER — HYDROCODONE BITARTRATE AND ACETAMINOPHEN 5; 325 MG/1; MG/1
1 TABLET ORAL EVERY 4 HOURS PRN
Qty: 15 TABLET | Refills: 0 | Status: SHIPPED | OUTPATIENT
Start: 2024-11-27 | End: 2024-11-30

## 2024-11-27 RX ADMIN — IOHEXOL 75 ML: 350 INJECTION, SOLUTION INTRAVENOUS at 11:11

## 2024-11-27 RX ADMIN — SODIUM CHLORIDE 500 ML: 0.9 INJECTION, SOLUTION INTRAVENOUS at 11:11

## 2024-11-27 NOTE — ED PROVIDER NOTES
Encounter Date: 11/27/2024       History     Chief Complaint   Patient presents with    Abdominal Pain     History of Present Illness  Nithin Hammonds is a 19 y.o. female that presents with right abdominal pain  Patient with a right-sided abdominal pain, no signs of peritonitis noted tonight  Patient had a ectopic pregnancy a month ago, denies dysuria or hematuria  No signs of peritonitis rebound with no guarding on ER evaluation this evening  Denies any vaginal discharge risk for STD PID or urinary tract infection tonight    The history is provided by the patient.     Review of patient's allergies indicates:  No Known Allergies  Past Medical History:   Diagnosis Date    ADHD      Past Surgical History:   Procedure Laterality Date    SALPINGECTOMY      THROAT SURGERY       Family History   Problem Relation Name Age of Onset    Breast cancer Maternal Grandmother      Hypertension Father      Hypertension Mother      Anemia Mother      Hypertension Brother      Hypertension Sister      Colon cancer Neg Hx      Ovarian cancer Neg Hx       Social History     Tobacco Use    Smoking status: Never    Smokeless tobacco: Never   Substance Use Topics    Alcohol use: Not Currently    Drug use: Never     Review of Systems   Constitutional: Negative.    HENT: Negative.     Eyes: Negative.    Respiratory: Negative.     Cardiovascular: Negative.    Gastrointestinal:  Positive for abdominal pain.   Genitourinary: Negative.    Musculoskeletal: Negative.    Skin: Negative.    Neurological: Negative.    Psychiatric/Behavioral: Negative.         Physical Exam     Initial Vitals [11/27/24 0929]   BP Pulse Resp Temp SpO2   (!) 153/83 110 20 98.5 °F (36.9 °C) 100 %      MAP       --         Physical Exam    Nursing note and vitals reviewed.  Constitutional: Vital signs are normal. She appears well-developed and well-nourished. She is cooperative.   HENT:   Head: Normocephalic and atraumatic.   Eyes: Conjunctivae, EOM and lids are  normal. Pupils are equal, round, and reactive to light.   Neck: Trachea normal and phonation normal. Neck supple. No JVD present.   Normal range of motion.   Full passive range of motion without pain.     Cardiovascular:  Normal rate, regular rhythm, S1 normal, S2 normal, normal heart sounds, intact distal pulses and normal pulses.           Pulmonary/Chest: Effort normal and breath sounds normal.   Abdominal: Abdomen is soft and flat. Bowel sounds are normal.   Musculoskeletal:         General: Normal range of motion.      Cervical back: Full passive range of motion without pain, normal range of motion and neck supple.     Neurological: She is alert and oriented to person, place, and time. She has normal strength.   Skin: Skin is warm, dry and intact. Capillary refill takes less than 2 seconds.         ED Course   Procedures  Labs Reviewed   CBC W/ AUTO DIFFERENTIAL - Abnormal       Result Value    WBC 7.62      RBC 4.28      Hemoglobin 11.9 (*)     Hematocrit 36.9 (*)     MCV 86      MCH 27.8      MCHC 32.2      RDW 12.2      Platelets 265      MPV 9.7      Immature Granulocytes 0.1      Gran # (ANC) 4.8      Immature Grans (Abs) 0.01      Lymph # 1.9      Mono # 0.6      Eos # 0.2      Baso # 0.06      nRBC 0      Gran % 63.6      Lymph % 25.3      Mono % 7.7      Eosinophil % 2.5      Basophil % 0.8      Differential Method Automated     URINALYSIS, REFLEX TO URINE CULTURE - Abnormal    Specimen UA Urine, Clean Catch      Color, UA Yellow      Appearance, UA Clear      pH, UA 6.0      Specific Gravity, UA 1.020      Protein, UA 1+ (*)     Glucose, UA Negative      Ketones, UA Trace (*)     Bilirubin (UA) Negative      Occult Blood UA Negative      Nitrite, UA Negative      Urobilinogen, UA Negative      Leukocytes, UA 1+ (*)     Narrative:     Specimen Source->Urine   URINALYSIS MICROSCOPIC - Abnormal    RBC, UA 1      WBC, UA 4      Bacteria Occasional      Yeast, UA Rare (*)     Squam Epithel, UA 5       Hyaline Casts, UA 0      Microscopic Comment SEE COMMENT      Narrative:     Specimen Source->Urine   COMPREHENSIVE METABOLIC PANEL    Sodium 138      Potassium 4.2      Chloride 104      CO2 23      Glucose 94      BUN 11      Creatinine 0.7      Calcium 9.6      Total Protein 7.4      Albumin 4.3      Total Bilirubin 0.5      Alkaline Phosphatase 66      AST 14      ALT 18      eGFR >60      Anion Gap 11     LIPASE    Lipase 16            Imaging Results              CT Abdomen Pelvis With IV Contrast NO Oral Contrast (Final result)  Result time 11/27/24 12:16:06      Final result by Denice Sullivan MD (11/27/24 12:16:06)                   Impression:      Right adnexal cyst measuring 4.8 cm, corresponding to the abnormality seen on recent ultrasound examination of 11/25/2024.      Electronically signed by: Denice Sullivan MD  Date:    11/27/2024  Time:    12:16               Narrative:    EXAMINATION:  CT ABDOMEN PELVIS WITH IV CONTRAST    CLINICAL HISTORY:  RLQ abdominal pain (Age >= 14y);    TECHNIQUE:  Low dose axial images, sagittal and coronal reformations were obtained from the lung bases to the pubic symphysis following the IV administration of 75 mL of Omnipaque 350 .  Oral contrast was not given.    COMPARISON:  11/25/2024    FINDINGS:  The lung bases are clear.  Base of the heart appears normal.  The aorta is of normal caliber and tapers appropriately.    No radiopaque gallstones are seen.  No intrahepatic or extrahepatic biliary ductal dilatation is identified.  The liver, spleen, pancreas, adrenal glands and kidneys are normal in size, shape and contour.  No hydronephrosis or hydroureter is seen.  The urinary bladder is well distended and appears normal.  The uterus and left adnexal region is unremarkable.  Right adnexal cyst measures 4.8 x 3.6 cm in transverse dimension, corresponding to the abnormality seen on recent ultrasound examination.  Small amount of free fluid in the pelvis.    Mild  colonic stool retention.  Normal appendix.  No free air, free fluid or obstruction.  No pathologically enlarged abdominal or pelvic lymph nodes are seen.    Age-appropriate degenerative changes affect the skeleton.                                       Medications   sodium chloride 0.9% bolus 500 mL 500 mL (500 mLs Intravenous New Bag 11/27/24 1130)   iohexoL (OMNIPAQUE 350) injection 75 mL (75 mLs Intravenous Given 11/27/24 1155)     Medical Decision Making  Differential includes enteritis, gastroenteritis, diverticulitis, appendicitis, gastritis  Differential includes ovarian torsion, ovarian cyst, residual ectopic pregnancy    Problems Addressed:  Cyst of right ovary: complicated acute illness or injury    Amount and/or Complexity of Data Reviewed  Labs: ordered. Decision-making details documented in ED Course.  Radiology: ordered and independent interpretation performed.    ED Management & Risks of Complication, Morbidity, & Mortality:  Lab work within normal limits, (-) urinalysis tonight  CT scan shows ovarian cyst, no new findings on evaluation today  Small amount of Norco, follow-up with OBGYN clinic on DC  Pt/Family counseled to return to the ER with any concerns on DC    Need for Hospitalization or Surgery with Social Determinants of Health:  This patient does not need to be hospitalized on ER evaluation today  The patient's diagnosis is not limited by social determinants of health  Does not require surgery or procedure (major or minor), no risk factors    Clinical Impression:  Final diagnoses:  [N83.201] Cyst of right ovary (Primary)          ED Disposition Condition    Discharge Stable          ED Prescriptions       Medication Sig Dispense Start Date End Date Auth. Provider    HYDROcodone-acetaminophen (NORCO) 5-325 mg per tablet Take 1 tablet by mouth every 4 (four) hours as needed for Pain. 15 tablet 11/27/2024 11/30/2024 Wolfgang Tabares MD          Follow-up Information       Follow up With  Specialties Details Why Contact Info    Fan James, NP Family Medicine Schedule an appointment as soon as possible for a visit in 2 days  [address] [place] [address]  3RD FLOOR  [facility]  [place] [address]  [phone]               Wolfgang Tabares MD  11/27/24 4069

## 2024-11-27 NOTE — ED TRIAGE NOTES
19 y.o. female presents to ER Room/bed info not found   Chief Complaint   Patient presents with    Abdominal Pain   .   Pt reports she was told per OB doctor to come to ER for eval of RLQ abd pain

## 2025-01-06 ENCOUNTER — APPOINTMENT (OUTPATIENT)
Dept: RADIOLOGY | Facility: CLINIC | Age: 20
End: 2025-01-06
Attending: NURSE PRACTITIONER
Payer: COMMERCIAL

## 2025-01-06 DIAGNOSIS — R10.2 PELVIC PAIN: ICD-10-CM

## 2025-01-06 PROCEDURE — 76856 US EXAM PELVIC COMPLETE: CPT | Mod: TC

## 2025-01-06 PROCEDURE — 76856 US EXAM PELVIC COMPLETE: CPT | Mod: 26,,, | Performed by: RADIOLOGY

## 2025-01-06 PROCEDURE — 76830 TRANSVAGINAL US NON-OB: CPT | Mod: 26,,, | Performed by: RADIOLOGY

## 2025-01-07 ENCOUNTER — PATIENT MESSAGE (OUTPATIENT)
Dept: OBSTETRICS AND GYNECOLOGY | Facility: CLINIC | Age: 20
End: 2025-01-07
Payer: COMMERCIAL

## 2025-01-07 NOTE — TELEPHONE ENCOUNTER
Pt desires ultrasound results from yesterday. Pt had 6 week follow up ultrasound for hemorrhagic cyst found on ultrasound from 11/25/24. Please advise. Thank you.

## 2025-01-15 ENCOUNTER — E-VISIT (OUTPATIENT)
Dept: OBSTETRICS AND GYNECOLOGY | Facility: CLINIC | Age: 20
End: 2025-01-15
Payer: COMMERCIAL

## 2025-01-15 ENCOUNTER — TELEPHONE (OUTPATIENT)
Dept: OBSTETRICS AND GYNECOLOGY | Facility: CLINIC | Age: 20
End: 2025-01-15
Payer: COMMERCIAL

## 2025-01-15 DIAGNOSIS — N89.8 VAGINA ITCHING: ICD-10-CM

## 2025-01-15 DIAGNOSIS — N89.8 VAGINAL DISCHARGE: Primary | ICD-10-CM

## 2025-01-15 PROCEDURE — 99422 OL DIG E/M SVC 11-20 MIN: CPT | Mod: ,,, | Performed by: STUDENT IN AN ORGANIZED HEALTH CARE EDUCATION/TRAINING PROGRAM

## 2025-01-15 RX ORDER — FLUCONAZOLE 150 MG/1
150 TABLET ORAL ONCE
Qty: 1 TABLET | Refills: 1 | Status: SHIPPED | OUTPATIENT
Start: 2025-01-15 | End: 2025-01-15

## 2025-01-15 NOTE — PROGRESS NOTES
Patient ID: Nithin Hammonds is a 19 y.o. female.    Chief Complaint: Vaginal Discharge (Entered automatically based on patient selection in [employer].)    The patient initiated a request through [employer] on 1/15/2025 for evaluation and management with a chief complaint of Vaginal Discharge (Entered automatically based on patient selection in [employer].)     I evaluated the questionnaire submission on 01/15/2025  .    Ohs Peq Evisit Vaginal Concerns    1/15/2025 12:27 PM CST - Filed by Patient   Do you agree to participate in an E-Visit? Yes   If you have any of the following symptoms,  please do not complete an E-Visit,  schedule an appointment with your provider: I acknowledge   Choose the state of your primary residence [place]   Do you have any of the following pregnancy-related conditions? None   What is the main issue you would like addressed today? Burn   Which of the following vaginal concerns do you have? Discharge;  Itching;  Pain   Do you have vaginal discharge? White/milky discharge   Do you have pain while passing urine? Yes   Do you have any of the following symptoms? Pain during sex    Have you taken antibiotics in the last two weeks? No    Do you use any of the following? No   Which of the following applies to your menstrual period? Have not had for a while   Which of the following applies to your menstrual cycle? No bleeding   Do you have spotting between periods? No   Do you have pain with your period? No   Have you had similar symptoms in the past? No   Have you had a temperature of 100.4 or higher? I don't know   Provide any additional information you feel is important. Burn   Please attach any relevant images or files    Are you able to take your vital signs? No         Encounter Diagnoses   Name Primary?    Vaginal discharge Yes    Vagina itching         No orders of the defined types were placed in this encounter.     Medications Ordered This Encounter   Medications    fluconazole (DIFLUCAN)  150 MG Tab     Sig: Take 1 tablet (150 mg total) by mouth once. for 1 dose     Dispense:  1 tablet     Refill:  1        No follow-ups on file.      E-Visit Time Tracking:    Day 1 Time (in minutes): 5    Total Time (in minutes): 5

## 2025-01-15 NOTE — TELEPHONE ENCOUNTER
Patient reports vaginal irritation and some white discharge when she wiped today. An evisit will be order. Patient was informed on how this works. She verbalized understanding.

## 2025-01-15 NOTE — TELEPHONE ENCOUNTER
----- Message from  sent at 1/15/2025  8:05 AM CST -----  Contact: Self  Nithin Hammonds  MRN: [id number]  Home Phone      Not on file.  Work Phone      Not on file.  Mobile          [phone]    Patient Care Team:  Fan James NP as PCP - General (Family Medicine)  Manuel Goyal MD as Obstetrician (Obstetrics)  Katey Kirkpatrick MD as Consulting Physician (Obstetrics and Gynecology)  Almita Beck MD as Consulting Physician (Obstetrics and Gynecology)   OB? No  What phone number can you be reached at? [phone]  Message: pt states she thinks procedure done here gave her a rash because her vaginal area is burning

## 2025-01-15 NOTE — Clinical Note
Nithin is adding multiple problems to this evisit. She is now talking about cuts in the vaginal area. This needs to be evaluated in person.

## 2025-01-16 NOTE — PROGRESS NOTES
Patient contacted and scheduled for an in person evaluation. She was given appointment date and time and verbalized understanding.

## 2025-01-17 ENCOUNTER — PATIENT MESSAGE (OUTPATIENT)
Dept: OBSTETRICS AND GYNECOLOGY | Facility: CLINIC | Age: 20
End: 2025-01-17

## 2025-01-17 ENCOUNTER — OFFICE VISIT (OUTPATIENT)
Dept: OBSTETRICS AND GYNECOLOGY | Facility: CLINIC | Age: 20
End: 2025-01-17
Payer: COMMERCIAL

## 2025-01-17 VITALS
WEIGHT: 158.38 LBS | BODY MASS INDEX: 29.15 KG/M2 | DIASTOLIC BLOOD PRESSURE: 84 MMHG | SYSTOLIC BLOOD PRESSURE: 118 MMHG | HEIGHT: 62 IN | HEART RATE: 92 BPM

## 2025-01-17 DIAGNOSIS — N89.8 VAGINAL IRRITATION: Primary | ICD-10-CM

## 2025-01-17 DIAGNOSIS — R30.0 DYSURIA: ICD-10-CM

## 2025-01-17 DIAGNOSIS — N76.0 ACUTE VAGINITIS: ICD-10-CM

## 2025-01-17 LAB
BILIRUB SERPL-MCNC: NORMAL MG/DL
BLOOD URINE, POC: NORMAL
CLARITY, POC UA: CLEAR
COLOR, POC UA: YELLOW
GLUCOSE UR QL STRIP: NORMAL
KETONES UR QL STRIP: 15
LEUKOCYTE ESTERASE URINE, POC: NORMAL
NITRITE, POC UA: NORMAL
PH, POC UA: 6
PROTEIN, POC: NORMAL
SPECIFIC GRAVITY, POC UA: >=1.03
UROBILINOGEN, POC UA: 0.2

## 2025-01-17 PROCEDURE — 99213 OFFICE O/P EST LOW 20 MIN: CPT | Mod: 24,S$GLB,,

## 2025-01-17 PROCEDURE — 81515 NFCT DS BV&VAGINITIS DNA ALG: CPT

## 2025-01-17 PROCEDURE — 81002 URINALYSIS NONAUTO W/O SCOPE: CPT | Mod: S$GLB,,,

## 2025-01-17 PROCEDURE — 3008F BODY MASS INDEX DOCD: CPT | Mod: CPTII,S$GLB,,

## 2025-01-17 PROCEDURE — 3074F SYST BP LT 130 MM HG: CPT | Mod: CPTII,S$GLB,,

## 2025-01-17 PROCEDURE — 99999 PR PBB SHADOW E&M-EST. PATIENT-LVL III: CPT | Mod: PBBFAC,,,

## 2025-01-17 PROCEDURE — 3079F DIAST BP 80-89 MM HG: CPT | Mod: CPTII,S$GLB,,

## 2025-01-17 PROCEDURE — 1159F MED LIST DOCD IN RCRD: CPT | Mod: CPTII,S$GLB,,

## 2025-01-17 RX ORDER — DEXMETHYLPHENIDATE HYDROCHLORIDE 20 MG/1
20 CAPSULE, EXTENDED RELEASE ORAL EVERY MORNING
COMMUNITY
Start: 2024-12-16

## 2025-01-17 NOTE — PROGRESS NOTES
"  Subjective:    Patient ID: Nithin Hammonds is a 19 y.o. y.o. female.     Chief Complaint: Follow up appt after [facility] with dr karolina perdue pt stated, "So when I pee, it hurts and burns, I dont know where the cuts are coming from by my vagina, but it hurts  And theres like a little bit of blood , but Im thinking thats from the cut." Started period today. Have felt off since ectopic pregnancy and tube removal in beginning of November.       History of Present Illness:  Nithin presents today complaining of cloudy urine, dysuria, urinary frequency, and vaginal discharge. Symptoms have been present for 2  weeks and have been gradually worsening. She also admits to : cloudy urine, dysuria, urinary frequency, and vaginal discharge      ROS:   CONSTITUTIONAL: Negative for fever, chills, diaphoresis, weakness, fatigue, weight loss, weight gain  CARDIOVASCULAR: no chest pain or dyspnea on exertion  RESPIRATORY: no cough, shortness of breath, or wheezing  BREAST: negative for breast  tenderness, breast mass, nipple discharge, or skin changes  GASTROINTESTINAL: negative for abdominal pain, flank pain, nausea, vomiting, diarrhea, constipation, black stool, blood in stool  GENITOURINARY: dysuria, frequency/urgency, vaginal bleeding  HEMATOLOGIC/LYMPHATIC: negative for swollen lymph nodes, bleeding, bruising  MUSCULOSKELETAL: negative for back pain, joint pain, joint stiffness, joint swelling, muscle pain, muscle weakness  ENDOCRINE: negative for polydipsia/polyuria, palpitations, skin changes, temperature intolerance, unexpected weight changes  ALLERGY and IMMUNOLOGY: negative  NEUROLOGICAL: no TIA or stroke symptoms  DERMATOLOGICAL: negative      Objective:    Vital Signs:  Vitals:    01/17/25 1526   BP: 118/84   Pulse: 92       Physical Exam:  General:  alert, cooperative, appears stated age, no distress   Skin:  Skin color, texture, turgor normal. No rashes or lesions   Abdomen:  normal findings: no masses palpable, no " organomegaly, soft, non-tender, and umbilicus normal   Pelvis: External genitalia: normal general appearance, pt freshly shaved mons pubis  Urinary system: urethral meatus normal, bladder nontender  Vaginal: normal mucosa without prolapse or lesions  Cervix: normal appearance, large amounts of bright red blood noted in vaginal vault and on exterior labia; first day of menses           Assessment:      1. Vaginal irritation    2. Dysuria    3. Acute vaginitis          Plan:      Vaginal irritation    Dysuria  -     POCT URINE DIPSTICK WITHOUT MICROSCOPE    Acute vaginitis  -     Vaginosis Screen by DNA Probe; Future; Expected date: 01/17/2025      Will send Urine for analysis; may send for culture depending on results. Will have blood in sample as moderate amt seen with exam due to being on day 1 of menses.  Affirm sent for yeast testing. Pt states burning/cuts sensation at introitus and around lower mons/upper clitoral region.no lesions noted. Skin intact. Pt states was recently tested for hsv and was negative, no suspicions of this. Suspect minor razor trauma from shaving. Will send rx to pharmacy if affirm confirms yeast.   Return PRN for care or yearly for WWE.   Pt TTC, recommend to continue PNV daily. Counseled on need for folic acid. Pt v/u and agrees with overall POC.

## 2025-01-19 LAB
BACTERIAL VAGINOSIS DNA: NOT DETECTED
CANDIDA GLABRATA/KRUSEI: NOT DETECTED
CANDIDA RRNA VAG QL PROBE: NOT DETECTED
TRICHOMONAS VAGINALIS: NOT DETECTED

## 2025-01-23 ENCOUNTER — PATIENT MESSAGE (OUTPATIENT)
Dept: OBSTETRICS AND GYNECOLOGY | Facility: CLINIC | Age: 20
End: 2025-01-23
Payer: COMMERCIAL

## 2025-01-24 NOTE — TELEPHONE ENCOUNTER
Patient is requesting test results from last visit. Please advise if ok to give them. Seems like they are back, but not sent to result in basket. Thank you.

## 2025-02-14 NOTE — PROGRESS NOTES
Orders:    amphetamine-dextroamphetamine XR (Adderall XR) 20 mg 24 hr capsule; Take 1 capsule (20 mg) by mouth 2 times a day. Do not crush or chew.    amphetamine-dextroamphetamine XR (Adderall XR) 20 mg 24 hr capsule; Take 1 capsule (20 mg) by mouth 2 times a day. Do not crush or chew. Do not fill before March 16, 2025.    amphetamine-dextroamphetamine XR (Adderall XR) 20 mg 24 hr capsule; Take 1 capsule (20 mg) by mouth 2 times a day. Do not crush or chew. Do not fill before April 15, 2025.       Subjective:    Patient ID: Nithin Hammonds is a 19 y.o. y.o. female.     Chief Complaint:   Chief Complaint   Patient presents with    Follow-up     Second opinion on STD cxs done at another clinic       History of Present Illness:  Nithin presents today requesting repeat testing for trichomonas.     Patient reports having discharge and odor; received treatment for a yeast infection from the initial positive test results. Repeat testing in 1 week was positive for trichomonas. She has been in a monogamous relationship for 5 years. She has not taken the flagyl. She has no symptoms today.         ROS:   Review of Systems   Constitutional:  Negative for activity change, appetite change, chills, diaphoresis, fatigue, fever and unexpected weight change.   HENT:  Negative for mouth sores and tinnitus.    Eyes:  Negative for discharge and visual disturbance.   Respiratory:  Negative for cough, shortness of breath and wheezing.    Cardiovascular:  Negative for chest pain, palpitations and leg swelling.   Gastrointestinal:  Negative for abdominal pain, bloating, blood in stool, constipation, diarrhea, nausea and vomiting.   Endocrine: Negative for diabetes, hair loss, hot flashes, hyperthyroidism and hypothyroidism.   Genitourinary:  Negative for dysuria, flank pain, frequency, genital sores, hematuria, urgency, vaginal bleeding, vaginal discharge, vaginal pain, postcoital bleeding and vaginal odor.   Musculoskeletal:  Negative for back pain, joint swelling and myalgias.   Integumentary:  Negative for rash, acne, breast mass, nipple discharge and breast skin changes.   Neurological:  Negative for seizures, syncope, numbness and headaches.   Hematological:  Negative for adenopathy. Does not bruise/bleed easily.   Psychiatric/Behavioral:  Negative for depression and sleep disturbance. The patient is not nervous/anxious.    Breast: Negative for mass, mastodynia, nipple discharge and skin changes          Objective:    Vital  Signs:  Vitals:    04/16/24 1053   BP: 120/78   Pulse: 93   Resp: 16       Physical Exam:  General:  alert, cooperative, no distress   Head Normocephalic, without obvious abnormality, atraumatic   Neck .supple, symmetrical, trachea midline   Skin:  Skin color, texture, turgor normal. No rashes or lesions   Abdomen:  soft, non-tender; bowel sounds normal   Pelvis: External genitalia: normal general appearance  Urinary system: urethral meatus normal, bladder nontender  Vaginal: normal mucosa without prolapse or lesions  Cervix: normal appearance  Uterus: normal size, shape, position  Adnexa: normal size, nontender bilaterally   Extremities extremities normal, atraumatic, no cyanosis or edema   Neurologic Grossly normal          Problem List Items Addressed This Visit    None  Visit Diagnoses       Vaginal odor    -  Primary    Relevant Orders    Vaginosis Screen by DNA Probe

## 2025-02-20 ENCOUNTER — CLINICAL SUPPORT (OUTPATIENT)
Dept: OBSTETRICS AND GYNECOLOGY | Facility: CLINIC | Age: 20
End: 2025-02-20
Payer: COMMERCIAL

## 2025-02-20 ENCOUNTER — LAB VISIT (OUTPATIENT)
Dept: LAB | Facility: HOSPITAL | Age: 20
End: 2025-02-20
Attending: STUDENT IN AN ORGANIZED HEALTH CARE EDUCATION/TRAINING PROGRAM
Payer: COMMERCIAL

## 2025-02-20 ENCOUNTER — RESULTS FOLLOW-UP (OUTPATIENT)
Dept: OBSTETRICS AND GYNECOLOGY | Facility: CLINIC | Age: 20
End: 2025-02-20

## 2025-02-20 ENCOUNTER — PATIENT MESSAGE (OUTPATIENT)
Dept: OBSTETRICS AND GYNECOLOGY | Facility: CLINIC | Age: 20
End: 2025-02-20

## 2025-02-20 DIAGNOSIS — N91.2 ABSENT MENSES: Primary | ICD-10-CM

## 2025-02-20 DIAGNOSIS — N91.2 ABSENT MENSES: ICD-10-CM

## 2025-02-20 DIAGNOSIS — N91.2 AMENORRHEA: Primary | ICD-10-CM

## 2025-02-20 LAB — HCG INTACT+B SERPL-ACNC: 662 MIU/ML

## 2025-02-20 PROCEDURE — 36415 COLL VENOUS BLD VENIPUNCTURE: CPT | Performed by: STUDENT IN AN ORGANIZED HEALTH CARE EDUCATION/TRAINING PROGRAM

## 2025-02-20 PROCEDURE — 84702 CHORIONIC GONADOTROPIN TEST: CPT | Performed by: STUDENT IN AN ORGANIZED HEALTH CARE EDUCATION/TRAINING PROGRAM

## 2025-02-20 NOTE — PROGRESS NOTES
Spoke with patient for approximately 30 minutes during OB navigator virtual visit.  Updated chart to reflect up to date patient demographics.  Allergies, medications, pharmacy, medical, surgical and family history updated. Substance and sexual activity, marital status, gender identity and OB/Gyn history updated.   Patient was guided through expectations of care during pregnancy. Pregnancy confirmation, dating u/s & first OB appts scheduled.  New pregnancy education provided & questions answered. Encouraged to send message or call office with any questions/concerns. Verbalized understanding.     Discussed with pt:    Lmp 1/16/25; cycles regular yes, +UPT on 2/11/25, currently 5 weeks 0d ays; DAVID 10/23/25  Encouraged to begin taking PNV daily  Denies n/v:   common in 1st tri  discussed safe options per Pregnancy A to Z/AWHONN guide  Denies cramping/spotting:   may be normal to have mild cramping/light spotting, kaylene in 1st tri & with sexual activity  Precautions/warning signs discussed:   encouraged to go to ED for excessive vag d/c, saturating a pad, bright red bleeding, painful cramping worse than menstrual cramps/abd pain that is interrupting daily activity  Encouraged and discussed healthy diet:   nothing raw; meat well done; heat deli meats & hot dogs prior to eating   avoid soft cheeses that are not fully pasteurized-ex: brie, feta, blue cheese   avoid fish high in mercury-limit canned tuna to once per week   rinse fruits/veggies thoroughly    Encouraged adequate fluids:  8-12 cups of water/healthy liquids each day  Limit caffeine to <200 mg/day (approx 1-2 cups coffee, tea or soda)   Extra rest can be beneficial, especially during the 1st trimester when it's normal to feel tired   Ok to continue to exercise but should not start anything new or more strenuous; No Hot Yoga or Sauna.   Normal weight gain:  1st trimester-may not gain anything at all or up to 5 lbs   Total weight gain approximately 25-35 lbs  depending on starting weight   Should avoid alcohol, smoking, vaping, drugs, herbal supplements  Should check with provider regarding safety of any prescription or OTC meds  Avoid ibuprofen (motrin, advil) & naproxen (aleve)  Ok to take regular strength tylenol for headache/body aches  Constipation - increase fiber and water hydration, ok to take Colace or Metamucil  Avoid cleaning cat litter box  Wear gloves with gardening   Important to wear seat belt when riding in vehicle-lower belt should be below abdomen   Will need car seat to bring baby home from hospital    Discussed normal breast changes during early pregnancy-breasts may feel full/tender   Discussed breastfeeding benefits/recommendations for mom & baby - undecided plans to breastfeed  Baby: decreased risk of ear infections, allergies, resp infections, SIDS, obesity, diabetes, some cancers  Mom: decreased risk of PP bleeding, breast, ovarian & uterine cancer, type 2 diabetes, depression, osteoporosis; improved heart health  Rec: exlusive breastfeeding for the first 6 months, introduction of complementary foods at 6 months with continued breastfeeding for up to 2 yrs of age or longer as desired by mom/baby (AAP)     InBasket message to pt through pt portal with information regarding the websiste [email or web address] for access to the Pregnancy A to Z guide/AWHONN guide and Prenatal Class schedule. Informed of ConnectedMOM program & encouraged to participate, Get Ready to Meet Your Baby pamphlet, Coffective munir and how to obtain a breast pump through insurance toward end of pregnancy

## 2025-02-22 ENCOUNTER — LAB VISIT (OUTPATIENT)
Dept: LAB | Facility: HOSPITAL | Age: 20
End: 2025-02-22
Attending: STUDENT IN AN ORGANIZED HEALTH CARE EDUCATION/TRAINING PROGRAM
Payer: COMMERCIAL

## 2025-02-22 DIAGNOSIS — N91.2 AMENORRHEA: ICD-10-CM

## 2025-02-22 LAB — HCG INTACT+B SERPL-ACNC: 1231 MIU/ML

## 2025-02-22 PROCEDURE — 84702 CHORIONIC GONADOTROPIN TEST: CPT | Performed by: STUDENT IN AN ORGANIZED HEALTH CARE EDUCATION/TRAINING PROGRAM

## 2025-02-22 PROCEDURE — 36415 COLL VENOUS BLD VENIPUNCTURE: CPT | Performed by: STUDENT IN AN ORGANIZED HEALTH CARE EDUCATION/TRAINING PROGRAM

## 2025-02-24 ENCOUNTER — RESULTS FOLLOW-UP (OUTPATIENT)
Dept: OBSTETRICS AND GYNECOLOGY | Facility: CLINIC | Age: 20
End: 2025-02-24

## 2025-02-25 RX ORDER — ONDANSETRON 4 MG/1
4 TABLET, ORALLY DISINTEGRATING ORAL EVERY 6 HOURS PRN
Qty: 15 TABLET | Refills: 0 | Status: SHIPPED | OUTPATIENT
Start: 2025-02-25

## 2025-02-25 NOTE — TELEPHONE ENCOUNTER
Please advise if it is ok for the patient to ride in two parades. One on Saturday and another on Tuesday. Thank you.

## 2025-02-28 ENCOUNTER — RESULTS FOLLOW-UP (OUTPATIENT)
Dept: OBSTETRICS AND GYNECOLOGY | Facility: CLINIC | Age: 20
End: 2025-02-28

## 2025-02-28 ENCOUNTER — OFFICE VISIT (OUTPATIENT)
Dept: OBSTETRICS AND GYNECOLOGY | Facility: CLINIC | Age: 20
End: 2025-02-28
Payer: COMMERCIAL

## 2025-02-28 ENCOUNTER — LAB VISIT (OUTPATIENT)
Dept: LAB | Facility: HOSPITAL | Age: 20
End: 2025-02-28
Attending: STUDENT IN AN ORGANIZED HEALTH CARE EDUCATION/TRAINING PROGRAM
Payer: COMMERCIAL

## 2025-02-28 VITALS
HEART RATE: 79 BPM | BODY MASS INDEX: 30 KG/M2 | WEIGHT: 163 LBS | SYSTOLIC BLOOD PRESSURE: 118 MMHG | DIASTOLIC BLOOD PRESSURE: 84 MMHG | HEIGHT: 62 IN

## 2025-02-28 DIAGNOSIS — R39.15 URINARY URGENCY: Primary | ICD-10-CM

## 2025-02-28 DIAGNOSIS — N91.2 AMENORRHEA: ICD-10-CM

## 2025-02-28 DIAGNOSIS — Z87.59 HISTORY OF ECTOPIC PREGNANCY: ICD-10-CM

## 2025-02-28 DIAGNOSIS — Z12.4 SCREENING FOR CERVICAL CANCER: ICD-10-CM

## 2025-02-28 DIAGNOSIS — Z11.3 SCREENING EXAMINATION FOR VENEREAL DISEASE: ICD-10-CM

## 2025-02-28 LAB — HCG INTACT+B SERPL-ACNC: 9447 MIU/ML

## 2025-02-28 PROCEDURE — 87086 URINE CULTURE/COLONY COUNT: CPT | Performed by: STUDENT IN AN ORGANIZED HEALTH CARE EDUCATION/TRAINING PROGRAM

## 2025-02-28 PROCEDURE — 87591 N.GONORRHOEAE DNA AMP PROB: CPT | Performed by: STUDENT IN AN ORGANIZED HEALTH CARE EDUCATION/TRAINING PROGRAM

## 2025-02-28 PROCEDURE — 84702 CHORIONIC GONADOTROPIN TEST: CPT | Performed by: STUDENT IN AN ORGANIZED HEALTH CARE EDUCATION/TRAINING PROGRAM

## 2025-02-28 PROCEDURE — 36415 COLL VENOUS BLD VENIPUNCTURE: CPT | Performed by: STUDENT IN AN ORGANIZED HEALTH CARE EDUCATION/TRAINING PROGRAM

## 2025-02-28 PROCEDURE — 99999 PR PBB SHADOW E&M-EST. PATIENT-LVL III: CPT | Mod: PBBFAC,,, | Performed by: STUDENT IN AN ORGANIZED HEALTH CARE EDUCATION/TRAINING PROGRAM

## 2025-02-28 NOTE — PROGRESS NOTES
Subjective:   Patient ID: Nithin Hammonds is a 20 y.o. y.o. female.     Chief Complaint: Missed Menses       History of Present Illness:    Nithin presents today complaining of amenorrhea. Patient's last menstrual period was 01/16/2025 (exact date).. Prior menstrual cycles have been regular. She reports breast tenderness, morning sickness, nausea, and positive home pregnancy test. UPT is positive.       Past Medical History:   Diagnosis Date    ADHD      Past Surgical History:   Procedure Laterality Date    EVACUATION OF HEMATOMA N/A 11/2/2024    Procedure: HEMOPERITONEUM REMOVAL;  Surgeon: Krysta Landry MD;  Location: [facility] OR;  Service: OB/GYN;  Laterality: N/A;    LAPAROSCOPIC SALPINGECTOMY Left 11/2/2024    Procedure: SALPINGECTOMY, LAPAROSCOPIC;  Surgeon: Krysta Landry MD;  Location: [facility] OR;  Service: OB/GYN;  Laterality: Left;    SALPINGECTOMY      SALPINGOSTOMY Left 11/2/2024    Procedure: LAPAROSCOPIC SALPINGOSTOMY (ATTEMPTED);  Surgeon: Krysta Landry MD;  Location: [facility] OR;  Service: OB/GYN;  Laterality: Left;    THROAT SURGERY       Social History     Socioeconomic History    Marital status: Single   Tobacco Use    Smoking status: Never    Smokeless tobacco: Never   Substance and Sexual Activity    Alcohol use: Not Currently    Drug use: Never    Sexual activity: Yes     Partners: Male     Birth control/protection: None     Social Drivers of Health     Financial Resource Strain: Low Risk  (11/2/2024)    Overall Financial Resource Strain (CARDIA)     Difficulty of Paying Living Expenses: Not hard at all   Food Insecurity: No Food Insecurity (11/2/2024)    Hunger Vital Sign     Worried About Running Out of Food in the Last Year: Never true     Ran Out of Food in the Last Year: Never true   Transportation Needs: No Transportation Needs (11/2/2024)    TRANSPORTATION NEEDS     Transportation : No   Stress: No Stress Concern Present (11/2/2024)    [employer] [place] [employer] - Occupational Stress Questionnaire     Feeling of Stress : Not at all   Housing Stability: Unknown (2024)    Housing Stability Vital Sign     Unable to Pay for Housing in the Last Year: No     Homeless in the Last Year: No     Family History   Problem Relation Name Age of Onset    Breast cancer Maternal Grandmother      Hypertension Father      Hypertension Mother      Anemia Mother      Hypertension Brother      Hypertension Sister      Colon cancer Neg Hx      Ovarian cancer Neg Hx       OB History    Para Term  AB Living   3    2    SAB IAB Ectopic Multiple Live Births   1  1        # Outcome Date GA Lbr Sebastian/2nd Weight Sex Type Anes PTL Lv   3 Current            2 Ectopic            1 SAB 11/15/23                 ROS:   Review of Systems   Constitutional:  Negative for chills, fatigue and fever.   HENT:  Negative for congestion, sinus pain and sore throat.    Eyes:  Negative for visual disturbance.   Respiratory:  Negative for cough, chest tightness and shortness of breath.    Cardiovascular:  Negative for chest pain and palpitations.   Gastrointestinal:  Positive for nausea and vomiting. Negative for abdominal pain, constipation and diarrhea.   Genitourinary:  Negative for menstrual problem, vaginal bleeding, vaginal discharge and vaginal pain.   Musculoskeletal:  Negative for myalgias.   Skin:  Negative for color change, pallor and rash.   Neurological:  Negative for dizziness, light-headedness and headaches.   Psychiatric/Behavioral:  The patient is not nervous/anxious.            Objective:   Vital Signs:  Vitals:    25 0948   BP: 118/84   Pulse: 79       Physical Exam:  General:  alert,normal appearing gravid female   Head: Normocephalic, atraumatic   Neck: Supple, Normal ROM   Respiratory: Normal effort   Neuro/Psych: Alert and oriented, appropriate mood and affect   Abdomen:  soft, non-tender; bowel sounds normal   Pelvis: External genitalia:  normal general appearance  Urinary system: urethral meatus normal, bladder nontender  Vaginal: normal mucosa without prolapse or lesions  Cervix: normal appearance  Uterus: normal size, shape, position  Adnexa: normal size, nontender bilaterally       Assessment:      1. Urinary urgency    2. Screening examination for venereal disease    3. Screening for cervical cancer    4. Amenorrhea    5. History of ectopic pregnancy          Plan:        Urinary urgency  -     Urine Culture High Risk    Screening examination for venereal disease  -     C. trachomatis/N. gonorrhoeae by AMP DNA [facility]; Cervix    Screening for cervical cancer  -     Liquid-Based Pap Smear, Screening    Amenorrhea  -     Hepatitis C Antibody; Future; Expected date: 02/28/2025  -     HIV-1 and HIV-2 antibodies; Future; Expected date: 02/28/2025  -     Treponema Pallidium Antibodies IgG, IgM; Future; Expected date: 02/28/2025  -     Hepatitis B surface antigen; Future; Expected date: 02/28/2025  -     Type & Screen; Future; Expected date: 02/28/2025  -     Rubella antibody, IgG; Future; Expected date: 02/28/2025  -     CBC auto differential; Future; Expected date: 02/28/2025  -     Hemoglobin Electrophoresis,Hgb A2 Venancio.; Future; Expected date: 02/28/2025  -     HCG, Quantitative; Future; Expected date: 02/28/2025  -     US OB/GYN Procedure ([facility]) - Extended List - Future; Future; Expected date: 03/07/2025    History of ectopic pregnancy        1. Pap today  2. Prenatal labs ordered and GC/CT performed.  3. Schedule Ultrasound  4. Follow up in 4 weeks after ultrasound.    Patient was counseled today on proper weight gain based on the [place] [employer]'s recommendations based on her pre-pregnancy weight. Discussed foods to avoid in pregnancy (i.e. sushi, fish that are high in mercury, deli meat, and unpasteurized cheeses). Discussed prenatal vitamin options (i.e. stool softener, DHA). She was also counseled on safe, healthy behavior as well  as medications safe in pregnancy.

## 2025-03-03 NOTE — TELEPHONE ENCOUNTER
Patient was prescribed Amoxicillin for a tooth infection. She is wanting to know if this is safe for her to take. Please advise.

## 2025-03-06 ENCOUNTER — PROCEDURE VISIT (OUTPATIENT)
Dept: MATERNAL FETAL MEDICINE | Facility: CLINIC | Age: 20
End: 2025-03-06
Payer: COMMERCIAL

## 2025-03-06 DIAGNOSIS — N91.2 ABSENT MENSES: ICD-10-CM

## 2025-03-06 PROCEDURE — 76801 OB US < 14 WKS SINGLE FETUS: CPT | Mod: S$GLB,,, | Performed by: OBSTETRICS & GYNECOLOGY

## 2025-03-17 ENCOUNTER — TELEPHONE (OUTPATIENT)
Dept: OBSTETRICS AND GYNECOLOGY | Facility: CLINIC | Age: 20
End: 2025-03-17
Payer: COMMERCIAL

## 2025-03-17 ENCOUNTER — HOSPITAL ENCOUNTER (EMERGENCY)
Facility: HOSPITAL | Age: 20
Discharge: HOME | End: 2025-03-17
Attending: SURGERY
Payer: COMMERCIAL

## 2025-03-17 VITALS
DIASTOLIC BLOOD PRESSURE: 68 MMHG | TEMPERATURE: 98 F | RESPIRATION RATE: 16 BRPM | OXYGEN SATURATION: 99 % | SYSTOLIC BLOOD PRESSURE: 121 MMHG | WEIGHT: 162.56 LBS | BODY MASS INDEX: 29.92 KG/M2 | HEIGHT: 62 IN | HEART RATE: 68 BPM

## 2025-03-17 DIAGNOSIS — O21.0 HYPEREMESIS GRAVIDARUM: Primary | ICD-10-CM

## 2025-03-17 LAB
ALBUMIN SERPL BCP-MCNC: 4.2 G/DL (ref 3.5–5.2)
ALP SERPL-CCNC: 53 U/L (ref 40–150)
ALT SERPL W/O P-5'-P-CCNC: 16 U/L (ref 10–44)
ANION GAP SERPL CALC-SCNC: 10 MMOL/L (ref 8–16)
AST SERPL-CCNC: 22 U/L (ref 10–40)
BACTERIA #/AREA URNS HPF: NORMAL /HPF
BASOPHILS # BLD AUTO: 0.08 K/UL (ref 0–0.2)
BASOPHILS NFR BLD: 0.9 % (ref 0–1.9)
BILIRUB SERPL-MCNC: 0.4 MG/DL (ref 0.1–1)
BILIRUB UR QL STRIP: NEGATIVE
BUN SERPL-MCNC: 8 MG/DL (ref 6–20)
CALCIUM SERPL-MCNC: 9.5 MG/DL (ref 8.7–10.5)
CHLORIDE SERPL-SCNC: 103 MMOL/L (ref 95–110)
CLARITY UR: CLEAR
CO2 SERPL-SCNC: 23 MMOL/L (ref 23–29)
COLOR UR: YELLOW
CREAT SERPL-MCNC: 0.6 MG/DL (ref 0.5–1.4)
DIFFERENTIAL METHOD BLD: ABNORMAL
EOSINOPHIL # BLD AUTO: 0.2 K/UL (ref 0–0.5)
EOSINOPHIL NFR BLD: 2.6 % (ref 0–8)
ERYTHROCYTE [DISTWIDTH] IN BLOOD BY AUTOMATED COUNT: 15.3 % (ref 11.5–14.5)
EST. GFR  (NO RACE VARIABLE): >60 ML/MIN/1.73 M^2
GLUCOSE SERPL-MCNC: 91 MG/DL (ref 70–110)
GLUCOSE UR QL STRIP: NEGATIVE
HCG INTACT+B SERPL-ACNC: NORMAL MIU/ML
HCT VFR BLD AUTO: 41.4 % (ref 37–48.5)
HGB BLD-MCNC: 13.5 G/DL (ref 12–16)
HGB UR QL STRIP: NEGATIVE
HYALINE CASTS #/AREA URNS LPF: 1 /LPF
IMM GRANULOCYTES # BLD AUTO: 0.03 K/UL (ref 0–0.04)
IMM GRANULOCYTES NFR BLD AUTO: 0.3 % (ref 0–0.5)
KETONES UR QL STRIP: NEGATIVE
LEUKOCYTE ESTERASE UR QL STRIP: ABNORMAL
LIPASE SERPL-CCNC: 23 U/L (ref 4–60)
LYMPHOCYTES # BLD AUTO: 2 K/UL (ref 1–4.8)
LYMPHOCYTES NFR BLD: 21.5 % (ref 18–48)
MCH RBC QN AUTO: 27.3 PG (ref 27–31)
MCHC RBC AUTO-ENTMCNC: 32.6 G/DL (ref 32–36)
MCV RBC AUTO: 84 FL (ref 82–98)
MICROSCOPIC COMMENT: NORMAL
MONOCYTES # BLD AUTO: 0.7 K/UL (ref 0.3–1)
MONOCYTES NFR BLD: 7.4 % (ref 4–15)
NEUTROPHILS # BLD AUTO: 6.2 K/UL (ref 1.8–7.7)
NEUTROPHILS NFR BLD: 67.3 % (ref 38–73)
NITRITE UR QL STRIP: NEGATIVE
NRBC BLD-RTO: 0 /100 WBC
PH UR STRIP: 6 [PH] (ref 5–8)
PLATELET # BLD AUTO: 273 K/UL (ref 150–450)
PMV BLD AUTO: 9.8 FL (ref 9.2–12.9)
POTASSIUM SERPL-SCNC: 4.3 MMOL/L (ref 3.5–5.1)
PROT SERPL-MCNC: 8.5 G/DL (ref 6–8.4)
PROT UR QL STRIP: NEGATIVE
RBC # BLD AUTO: 4.94 M/UL (ref 4–5.4)
RBC #/AREA URNS HPF: 1 /HPF (ref 0–4)
SODIUM SERPL-SCNC: 136 MMOL/L (ref 136–145)
SP GR UR STRIP: <=1.005 (ref 1–1.03)
SQUAMOUS #/AREA URNS HPF: 10 /HPF
URN SPEC COLLECT METH UR: ABNORMAL
UROBILINOGEN UR STRIP-ACNC: NEGATIVE EU/DL
WBC # BLD AUTO: 9.23 K/UL (ref 3.9–12.7)
WBC #/AREA URNS HPF: 3 /HPF (ref 0–5)

## 2025-03-17 PROCEDURE — 81000 URINALYSIS NONAUTO W/SCOPE: CPT | Performed by: SURGERY

## 2025-03-17 PROCEDURE — 96361 HYDRATE IV INFUSION ADD-ON: CPT

## 2025-03-17 PROCEDURE — 80053 COMPREHEN METABOLIC PANEL: CPT | Performed by: SURGERY

## 2025-03-17 PROCEDURE — 63600175 PHARM REV CODE 636 W HCPCS: Performed by: SURGERY

## 2025-03-17 PROCEDURE — 96374 THER/PROPH/DIAG INJ IV PUSH: CPT

## 2025-03-17 PROCEDURE — 84702 CHORIONIC GONADOTROPIN TEST: CPT | Performed by: SURGERY

## 2025-03-17 PROCEDURE — 99284 EMERGENCY DEPT VISIT MOD MDM: CPT

## 2025-03-17 PROCEDURE — 83690 ASSAY OF LIPASE: CPT | Performed by: SURGERY

## 2025-03-17 PROCEDURE — 25000003 PHARM REV CODE 250: Performed by: SURGERY

## 2025-03-17 PROCEDURE — 85025 COMPLETE CBC W/AUTO DIFF WBC: CPT | Performed by: SURGERY

## 2025-03-17 RX ORDER — ONDANSETRON HYDROCHLORIDE 2 MG/ML
4 INJECTION, SOLUTION INTRAVENOUS
Status: COMPLETED | OUTPATIENT
Start: 2025-03-17 | End: 2025-03-17

## 2025-03-17 RX ORDER — ONDANSETRON 4 MG/1
4 TABLET, ORALLY DISINTEGRATING ORAL EVERY 6 HOURS PRN
Qty: 15 TABLET | Refills: 0 | Status: SHIPPED | OUTPATIENT
Start: 2025-03-17

## 2025-03-17 RX ADMIN — ONDANSETRON 4 MG: 2 INJECTION INTRAMUSCULAR; INTRAVENOUS at 10:03

## 2025-03-17 RX ADMIN — SODIUM CHLORIDE 2000 ML: 9 INJECTION, SOLUTION INTRAVENOUS at 10:03

## 2025-03-17 NOTE — TELEPHONE ENCOUNTER
----- Message from  sent at 3/17/2025  8:43 AM CDT -----  Contact: Self  Nithin GraciaTraceRN: [phone] Phone      Not on file.Work Phone      Not on file.Mobile          [phone] Care Team:Fan James NP as PCP - General (Family Medicine)Manuel Goyal MD as Obstetrician (Obstetrics)Katey Kirkpatrick MD as Consulting Physician (Obstetrics and Gynecology)Almita Beck MD as Consulting Physician (Obstetrics and Gynecology)Mariel Cheung CNM as Midwife (Obstetrics and Gynecology)OB? Yes, UnknownWhat phone number can you be reached at? [phone]: following up with possibly going to the hospital for fluids   [facility] Stay Note:  Arrived - 1600 for Procrit    Visit Vitals    /67 (BP 1 Location: Left arm, BP Patient Position: Sitting)    Pulse 83    Temp 98.4 °F (36.9 °C)    Resp 18    SpO2 97%       Assessment - completed. No concerns voiced other than pt has bruises to L side of abdomen from Heparin injections at rehab. Labs reviewed from 9/25/17. Hgb - 10.1    Procrit 40,000 units SQ slowly in L arm.    1615 - Tolerated well. Pt denies any acute problems/changes. Discharged from [facility] ambulatory. No distress.  Next appt: 10/26 at 4 PM.

## 2025-03-17 NOTE — Clinical Note
"Nithin Boyd" Nasra was seen and treated in our emergency department on 3/17/2025.  She may return to work on 03/19/2025.       If you have any questions or concerns, please don't hesitate to call.      Cristal Amaro RN    "

## 2025-03-17 NOTE — TELEPHONE ENCOUNTER
The patient reports that she is still having the morning sickness. Reports that she takes the Zofran, but is cautious with it due to it causing constipation. I advised that she can take it, along with a stool softener. I suggested josette products to help, eating crackers or toast, and smaller frequent meals. Is there any additional nausea medication she can take to help? Please advise. Thank you.

## 2025-03-17 NOTE — ED PROVIDER NOTES
Encounter Date: 3/17/2025       History     Chief Complaint   Patient presents with    Hyperemesis Gravidarum     Nithin Linda Hammonds is a 20 y.o. female with PMH of ADHD who is 7 weeks IUP presenting to the ED for evaluation of vomiting. Patient reports that since pregnancy Dx she has been nauseous with vomiting almost daily mainly in the AM. For the past 2 days, she has been unable to tolerate fluids despite taking Zofran. She tried eating toast this morning and vomited shortly after. She denies abdominal pain, cramping, vaginal leakage or bleeding. This is  her 3rd pregnancy with two previous miscarriages. DAVID 10/29/25. She is followed by Dr. Beck.     The history is provided by the patient.     Review of patient's allergies indicates:  No Known Allergies  Past Medical History:   Diagnosis Date    ADHD      Past Surgical History:   Procedure Laterality Date    EVACUATION OF HEMATOMA N/A 11/2/2024    Procedure: HEMOPERITONEUM REMOVAL;  Surgeon: Krysta Landry MD;  Location: [facility] OR;  Service: OB/GYN;  Laterality: N/A;    LAPAROSCOPIC SALPINGECTOMY Left 11/2/2024    Procedure: SALPINGECTOMY, LAPAROSCOPIC;  Surgeon: Krysta Landry MD;  Location: [facility] OR;  Service: OB/GYN;  Laterality: Left;    SALPINGECTOMY      SALPINGOSTOMY Left 11/2/2024    Procedure: LAPAROSCOPIC SALPINGOSTOMY (ATTEMPTED);  Surgeon: Krysta Landry MD;  Location: [facility] OR;  Service: OB/GYN;  Laterality: Left;    THROAT SURGERY       Family History   Problem Relation Name Age of Onset    Breast cancer Maternal Grandmother      Hypertension Father      Hypertension Mother      Anemia Mother      Hypertension Brother      Hypertension Sister      Colon cancer Neg Hx      Ovarian cancer Neg Hx       Social History[1]  Review of Systems   Constitutional:  Positive for appetite change. Negative for activity change, chills and fever.   HENT:  Negative for congestion, ear discharge, ear pain, postnasal drip, sinus pressure, sinus  pain and sore throat.    Respiratory:  Negative for cough, chest tightness and shortness of breath.    Cardiovascular:  Negative for chest pain.   Gastrointestinal:  Positive for nausea and vomiting. Negative for abdominal distention and abdominal pain.   Genitourinary:  Negative for dysuria, frequency and urgency.   Musculoskeletal:  Negative for back pain.   Skin:  Negative for rash.   Neurological:  Negative for dizziness, weakness, light-headedness and numbness.   Hematological:  Does not bruise/bleed easily.       Physical Exam     Initial Vitals [03/17/25 1013]   BP Pulse Resp Temp SpO2   123/70 83 18 98.6 °F (37 °C) 100 %      MAP       --         Physical Exam    Nursing note and vitals reviewed.  Constitutional: She appears well-developed and well-nourished.   HENT:   Head: Normocephalic and atraumatic.   Eyes: Conjunctivae and EOM are normal. Pupils are equal, round, and reactive to light.   Neck: Neck supple.   Cardiovascular:  Normal rate, regular rhythm, normal heart sounds and intact distal pulses.           Pulmonary/Chest: Breath sounds normal.   Abdominal: Abdomen is soft. Bowel sounds are normal. There is no abdominal tenderness.   Abdomen is soft and nontender   Musculoskeletal:         General: Normal range of motion.      Cervical back: Neck supple.     Neurological: She is alert and oriented to person, place, and time. She has normal strength.   Skin: Skin is warm and dry.   Psychiatric: She has a normal mood and affect. Her behavior is normal. Judgment and thought content normal.         ED Course   Procedures  Labs Reviewed   COMPREHENSIVE METABOLIC PANEL - Abnormal       Result Value    Sodium 136      Potassium 4.3      Chloride 103      CO2 23      Glucose 91      BUN 8      Creatinine 0.6      Calcium 9.5      Total Protein 8.5 (*)     Albumin 4.2      Total Bilirubin 0.4      Alkaline Phosphatase 53      AST 22      ALT 16      eGFR >60      Anion Gap 10     CBC W/ AUTO DIFFERENTIAL -  Abnormal    WBC 9.23      RBC 4.94      Hemoglobin 13.5      Hematocrit 41.4      MCV 84      MCH 27.3      MCHC 32.6      RDW 15.3 (*)     Platelets 273      MPV 9.8      Immature Granulocytes 0.3      Gran # (ANC) 6.2      Immature Grans (Abs) 0.03      Lymph # 2.0      Mono # 0.7      Eos # 0.2      Baso # 0.08      nRBC 0      Gran % 67.3      Lymph % 21.5      Mono % 7.4      Eosinophil % 2.6      Basophil % 0.9      Differential Method Automated     URINALYSIS, REFLEX TO URINE CULTURE - Abnormal    Specimen UA Urine, Clean Catch      Color, UA Yellow      Appearance, UA Clear      pH, UA 6.0      Specific Gravity, UA <=1.005 (*)     Protein, UA Negative      Glucose, UA Negative      Ketones, UA Negative      Bilirubin (UA) Negative      Occult Blood UA Negative      Nitrite, UA Negative      Urobilinogen, UA Negative      Leukocytes, UA Trace (*)     Narrative:     Specimen Source->Urine   LIPASE    Lipase 23     HCG, QUANTITATIVE    HCG Quant [id number]     URINALYSIS MICROSCOPIC    RBC, UA 1      WBC, UA 3      Bacteria Occasional      Squam Epithel, UA 10      Hyaline Casts, UA 1      Microscopic Comment Mucus      Narrative:     Specimen Source->Urine          Imaging Results    None          Medications   sodium chloride 0.9% bolus 2,000 mL 2,000 mL (0 mLs Intravenous Stopped 3/17/25 1142)   ondansetron injection 4 mg (4 mg Intravenous Given 3/17/25 1048)     Medical Decision Making  Evaluation of a 20-year-old female who is 7 weeks IUP presenting with nausea and vomiting.  Presents nontoxic appearing with stable vital signs  Physical exam with dry mucous membranes  Abdomen is soft and nontender    Differential diagnosis includes hyperemesis, dehydration electrolyte derangement, UTI    Amount and/or Complexity of Data Reviewed  Labs: ordered. Decision-making details documented in ED Course.    Risk  Prescription drug management.  Risk Details: Stable for discharge home.  Lab workup with no leukocytosis.  CMP  is grossly normal.  UA with no signs of infection.  Patient given 2 L of IV normal saline in addition to IV Zofran with improvement in symptoms.  She tolerated a food tray without further episodes of vomiting.  Will discharge home with close outpatient follow-up with OBGYN. Patient/caregiver voices understanding and feels comfortable with discharge plan.      The patient acknowledges that close follow up with medical provider is required. Instructed to follow up with PCP within 2 days. Patient was given specific return precautions. The patient agrees to comply with all instruction and directions given in the ER.                                        Clinical Impression:  Final diagnoses:  [O21.0] Hyperemesis gravidarum (Primary)          ED Disposition Condition    Discharge Stable          ED Prescriptions       Medication Sig Dispense Start Date End Date Auth. Provider    ondansetron (ZOFRAN-ODT) 4 MG TbDL Take 1 tablet (4 mg total) by mouth every 6 (six) hours as needed. 15 tablet 3/17/2025 -- Wolfgang Tabares MD          Follow-up Information       Follow up With Specialties Details Why Contact Info Additional Information    [facility] Obstetrics and Gynecology Schedule an appointment as soon as possible for a visit in 2 days  [address] [place] [id number] [phone] [facility]                 [1]   Social History  Tobacco Use    Smoking status: Never    Smokeless tobacco: Never   Substance Use Topics    Alcohol use: Not Currently    Drug use: Never        Lis Murrieta NP  03/17/25 [id number]

## 2025-03-17 NOTE — Clinical Note
"Nithin Boyd" Nasra was seen and treated in our emergency department on 3/17/2025.  She may return to work on 03/20/2025.       If you have any questions or concerns, please don't hesitate to call.      Cristal Amaro RN    "

## 2025-03-17 NOTE — TELEPHONE ENCOUNTER
I called and spoke to the patient. She was tearful on the phone. I advised for her to report to the ER for an evaluation to see if she needs fluids. She reports that she tried eating crackers and toast and vomited after eating. I let her know that I was still addressing with Dr. Beck to see if we can have something else sent in for her and we would still see her on Wednesday. She verbalized understanding.

## 2025-03-17 NOTE — ED TRIAGE NOTES
Patient sent to ED per Dr. Rick Capellan for IV fluids. Patient is 7 weeks pregnant and vomiting x 2 weeks every morning until 10 am in the morning. Denies abdominal pain or vaginal bleeding.

## 2025-03-19 ENCOUNTER — LAB VISIT (OUTPATIENT)
Dept: LAB | Facility: HOSPITAL | Age: 20
End: 2025-03-19
Attending: STUDENT IN AN ORGANIZED HEALTH CARE EDUCATION/TRAINING PROGRAM
Payer: COMMERCIAL

## 2025-03-19 ENCOUNTER — INITIAL PRENATAL (OUTPATIENT)
Dept: OBSTETRICS AND GYNECOLOGY | Facility: CLINIC | Age: 20
End: 2025-03-19
Payer: COMMERCIAL

## 2025-03-19 VITALS
SYSTOLIC BLOOD PRESSURE: 116 MMHG | DIASTOLIC BLOOD PRESSURE: 74 MMHG | BODY MASS INDEX: 30.02 KG/M2 | WEIGHT: 164.13 LBS | HEART RATE: 71 BPM

## 2025-03-19 DIAGNOSIS — N91.2 AMENORRHEA: ICD-10-CM

## 2025-03-19 DIAGNOSIS — Z34.91 NORMAL PREGNANCY IN FIRST TRIMESTER: Primary | ICD-10-CM

## 2025-03-19 LAB
ABO + RH BLD: NORMAL
BASOPHILS # BLD AUTO: 0.08 K/UL (ref 0–0.2)
BASOPHILS NFR BLD: 0.8 % (ref 0–1.9)
BLD GP AB SCN CELLS X3 SERPL QL: NORMAL
DIFFERENTIAL METHOD BLD: ABNORMAL
EOSINOPHIL # BLD AUTO: 0.4 K/UL (ref 0–0.5)
EOSINOPHIL NFR BLD: 3.8 % (ref 0–8)
ERYTHROCYTE [DISTWIDTH] IN BLOOD BY AUTOMATED COUNT: 15.3 % (ref 11.5–14.5)
HBV SURFACE AG SERPL QL IA: NORMAL
HCT VFR BLD AUTO: 38.5 % (ref 37–48.5)
HCV AB SERPL QL IA: NORMAL
HGB BLD-MCNC: 12.6 G/DL (ref 12–16)
HIV 1+2 AB+HIV1 P24 AG SERPL QL IA: NORMAL
IMM GRANULOCYTES # BLD AUTO: 0.04 K/UL (ref 0–0.04)
IMM GRANULOCYTES NFR BLD AUTO: 0.4 % (ref 0–0.5)
LYMPHOCYTES # BLD AUTO: 2.4 K/UL (ref 1–4.8)
LYMPHOCYTES NFR BLD: 24.7 % (ref 18–48)
MCH RBC QN AUTO: 27.6 PG (ref 27–31)
MCHC RBC AUTO-ENTMCNC: 32.7 G/DL (ref 32–36)
MCV RBC AUTO: 84 FL (ref 82–98)
MONOCYTES # BLD AUTO: 0.8 K/UL (ref 0.3–1)
MONOCYTES NFR BLD: 7.7 % (ref 4–15)
NEUTROPHILS # BLD AUTO: 6.1 K/UL (ref 1.8–7.7)
NEUTROPHILS NFR BLD: 62.6 % (ref 38–73)
NRBC BLD-RTO: 0 /100 WBC
PLATELET # BLD AUTO: 289 K/UL (ref 150–450)
PMV BLD AUTO: 9.9 FL (ref 9.2–12.9)
RBC # BLD AUTO: 4.56 M/UL (ref 4–5.4)
SPECIMEN OUTDATE: NORMAL
TREPONEMA PALLIDUM IGG+IGM AB [PRESENCE] IN SERUM OR PLASMA BY IMMUNOASSAY: NONREACTIVE
WBC # BLD AUTO: 9.82 K/UL (ref 3.9–12.7)

## 2025-03-19 PROCEDURE — 87340 HEPATITIS B SURFACE AG IA: CPT | Performed by: STUDENT IN AN ORGANIZED HEALTH CARE EDUCATION/TRAINING PROGRAM

## 2025-03-19 PROCEDURE — 87389 HIV-1 AG W/HIV-1&-2 AB AG IA: CPT | Performed by: STUDENT IN AN ORGANIZED HEALTH CARE EDUCATION/TRAINING PROGRAM

## 2025-03-19 PROCEDURE — 0502F SUBSEQUENT PRENATAL CARE: CPT | Mod: CPTII,S$GLB,, | Performed by: STUDENT IN AN ORGANIZED HEALTH CARE EDUCATION/TRAINING PROGRAM

## 2025-03-19 PROCEDURE — 86762 RUBELLA ANTIBODY: CPT | Performed by: STUDENT IN AN ORGANIZED HEALTH CARE EDUCATION/TRAINING PROGRAM

## 2025-03-19 PROCEDURE — 83021 HEMOGLOBIN CHROMOTOGRAPHY: CPT | Performed by: STUDENT IN AN ORGANIZED HEALTH CARE EDUCATION/TRAINING PROGRAM

## 2025-03-19 PROCEDURE — 86850 RBC ANTIBODY SCREEN: CPT | Performed by: STUDENT IN AN ORGANIZED HEALTH CARE EDUCATION/TRAINING PROGRAM

## 2025-03-19 PROCEDURE — 86593 SYPHILIS TEST NON-TREP QUANT: CPT | Performed by: STUDENT IN AN ORGANIZED HEALTH CARE EDUCATION/TRAINING PROGRAM

## 2025-03-19 PROCEDURE — 86803 HEPATITIS C AB TEST: CPT | Performed by: STUDENT IN AN ORGANIZED HEALTH CARE EDUCATION/TRAINING PROGRAM

## 2025-03-19 PROCEDURE — 99999 PR PBB SHADOW E&M-EST. PATIENT-LVL II: CPT | Mod: PBBFAC,,, | Performed by: STUDENT IN AN ORGANIZED HEALTH CARE EDUCATION/TRAINING PROGRAM

## 2025-03-19 PROCEDURE — 85025 COMPLETE CBC W/AUTO DIFF WBC: CPT | Performed by: STUDENT IN AN ORGANIZED HEALTH CARE EDUCATION/TRAINING PROGRAM

## 2025-03-19 PROCEDURE — 36415 COLL VENOUS BLD VENIPUNCTURE: CPT | Performed by: STUDENT IN AN ORGANIZED HEALTH CARE EDUCATION/TRAINING PROGRAM

## 2025-03-19 NOTE — PROGRESS NOTES
Patient doing well. No vaginal bleeding or cramping noted. Discussed the need for an anatomy scan between 18-20 weeks. Discussed quad screen. RTC in 4 weeks.     Coffective counseling sheet Get Ready discussed with mother. Reinforced avoiding induction of labor unless medically indicated as well as comfort measures during labor.  Encouraged mother to download Coffective mobile munir if she has not already done so. Mother verbalizes understanding.    Vitals signs, FHTs, urine dip, and PE findings documented, reviewed and available in OB flow chart.    I spent a total of 20 minutes on the day of the visit. This includes face to face time and non-face to face time preparing to see the patient (eg, review of tests), Obtaining and/or reviewing separately obtained history, Documenting clinical information in the electronic or other health record, Independently interpreting resultsand communicating results to the patient/family/caregiver, or Care coordination.

## 2025-03-20 LAB
RUBV IGG SER-ACNC: 11.7 IU/ML
RUBV IGG SER-IMP: REACTIVE

## 2025-03-21 LAB
HGB A2 MFR BLD HPLC: 2.7 % (ref 2.2–3.2)
HGB FRACT BLD ELPH-IMP: NORMAL
HGB FRACT BLD ELPH-IMP: NORMAL

## 2025-04-14 ENCOUNTER — PATIENT MESSAGE (OUTPATIENT)
Dept: OBSTETRICS AND GYNECOLOGY | Facility: CLINIC | Age: 20
End: 2025-04-14

## 2025-04-14 ENCOUNTER — ROUTINE PRENATAL (OUTPATIENT)
Dept: OBSTETRICS AND GYNECOLOGY | Facility: CLINIC | Age: 20
End: 2025-04-14
Payer: COMMERCIAL

## 2025-04-14 VITALS
BODY MASS INDEX: 29.68 KG/M2 | HEART RATE: 66 BPM | WEIGHT: 162.25 LBS | SYSTOLIC BLOOD PRESSURE: 104 MMHG | DIASTOLIC BLOOD PRESSURE: 68 MMHG

## 2025-04-14 DIAGNOSIS — Z34.91 NORMAL PREGNANCY IN FIRST TRIMESTER: Primary | ICD-10-CM

## 2025-04-14 PROCEDURE — 0502F SUBSEQUENT PRENATAL CARE: CPT | Mod: CPTII,S$GLB,, | Performed by: STUDENT IN AN ORGANIZED HEALTH CARE EDUCATION/TRAINING PROGRAM

## 2025-04-14 PROCEDURE — 99999 PR PBB SHADOW E&M-EST. PATIENT-LVL II: CPT | Mod: PBBFAC,,, | Performed by: STUDENT IN AN ORGANIZED HEALTH CARE EDUCATION/TRAINING PROGRAM

## 2025-04-21 ENCOUNTER — RESULTS FOLLOW-UP (OUTPATIENT)
Dept: OBSTETRICS AND GYNECOLOGY | Facility: CLINIC | Age: 20
End: 2025-04-21

## 2025-05-14 ENCOUNTER — PATIENT MESSAGE (OUTPATIENT)
Dept: OTHER | Facility: OTHER | Age: 20
End: 2025-05-14
Payer: COMMERCIAL

## 2025-05-14 ENCOUNTER — ROUTINE PRENATAL (OUTPATIENT)
Dept: OBSTETRICS AND GYNECOLOGY | Facility: CLINIC | Age: 20
End: 2025-05-14
Payer: COMMERCIAL

## 2025-05-14 ENCOUNTER — PATIENT MESSAGE (OUTPATIENT)
Dept: OBSTETRICS AND GYNECOLOGY | Facility: CLINIC | Age: 20
End: 2025-05-14

## 2025-05-14 VITALS
SYSTOLIC BLOOD PRESSURE: 124 MMHG | DIASTOLIC BLOOD PRESSURE: 82 MMHG | BODY MASS INDEX: 29.6 KG/M2 | HEART RATE: 76 BPM | WEIGHT: 161.81 LBS

## 2025-05-14 DIAGNOSIS — Z34.92 NORMAL PREGNANCY IN SECOND TRIMESTER: Primary | ICD-10-CM

## 2025-05-14 PROCEDURE — 0502F SUBSEQUENT PRENATAL CARE: CPT | Mod: CPTII,S$GLB,, | Performed by: STUDENT IN AN ORGANIZED HEALTH CARE EDUCATION/TRAINING PROGRAM

## 2025-05-14 PROCEDURE — 99999 PR PBB SHADOW E&M-EST. PATIENT-LVL II: CPT | Mod: PBBFAC,,, | Performed by: STUDENT IN AN ORGANIZED HEALTH CARE EDUCATION/TRAINING PROGRAM

## 2025-05-14 NOTE — PROGRESS NOTES
Patient with no complaints. Denies vaginal bleeding or cramping. Anatomy scan ordered and scheduled. RTC in 4 weeks    Coffective counseling sheet Fall In Love discussed with mother. Reinforced immediate skin to skin, the magic first hour, importance of the first feeding and delaying routine procedures. Encouraged mother to download Coffective mobile munir if she has not already done so. Mother verbalizes understanding.    Vitals signs, FHTs, urine dip, and PE findings documented, reviewed and available in OB flow chart.    I spent a total of 20 minutes on the day of the visit. This includes face to face time and non-face to face time preparing to see the patient (eg, review of tests), Obtaining and/or reviewing separately obtained history, Documenting clinical information in the electronic or other health record, Independently interpreting resultsand communicating results to the patient/family/caregiver, or Care coordination.

## 2025-05-21 ENCOUNTER — PATIENT MESSAGE (OUTPATIENT)
Dept: OTHER | Facility: OTHER | Age: 20
End: 2025-05-21
Payer: COMMERCIAL

## 2025-05-27 ENCOUNTER — PATIENT MESSAGE (OUTPATIENT)
Dept: OBSTETRICS AND GYNECOLOGY | Facility: CLINIC | Age: 20
End: 2025-05-27
Payer: COMMERCIAL

## 2025-05-27 ENCOUNTER — HOSPITAL ENCOUNTER (EMERGENCY)
Facility: HOSPITAL | Age: 20
Discharge: HOME | End: 2025-05-27
Payer: COMMERCIAL

## 2025-05-27 VITALS
SYSTOLIC BLOOD PRESSURE: 120 MMHG | OXYGEN SATURATION: 99 % | WEIGHT: 163.56 LBS | RESPIRATION RATE: 16 BRPM | DIASTOLIC BLOOD PRESSURE: 57 MMHG | TEMPERATURE: 98 F | HEART RATE: 88 BPM | BODY MASS INDEX: 30.1 KG/M2 | HEIGHT: 62 IN

## 2025-05-27 DIAGNOSIS — N30.90 CYSTITIS: Primary | ICD-10-CM

## 2025-05-27 DIAGNOSIS — R10.9 ABDOMINAL PAIN: ICD-10-CM

## 2025-05-27 LAB
BACTERIA #/AREA URNS HPF: ABNORMAL /HPF
BILIRUB UR QL STRIP.AUTO: NEGATIVE
CLARITY UR: ABNORMAL
COLOR UR AUTO: YELLOW
GLUCOSE UR QL STRIP: NEGATIVE
HGB UR QL STRIP: NEGATIVE
HOLD SPECIMEN: NORMAL
KETONES UR QL STRIP: NEGATIVE
LEUKOCYTE ESTERASE UR QL STRIP: ABNORMAL
MICROSCOPIC COMMENT: ABNORMAL
NITRITE UR QL STRIP: NEGATIVE
PH UR STRIP: 6 [PH]
PROT UR QL STRIP: ABNORMAL
SP GR UR STRIP: 1.02
SQUAMOUS #/AREA URNS HPF: 30 /HPF
UROBILINOGEN UR STRIP-ACNC: NEGATIVE EU/DL
WBC #/AREA URNS HPF: 20 /HPF (ref 0–5)

## 2025-05-27 PROCEDURE — 81001 URINALYSIS AUTO W/SCOPE: CPT | Performed by: FAMILY MEDICINE

## 2025-05-27 PROCEDURE — 87086 URINE CULTURE/COLONY COUNT: CPT | Performed by: FAMILY MEDICINE

## 2025-05-27 PROCEDURE — 99284 EMERGENCY DEPT VISIT MOD MDM: CPT | Mod: 25

## 2025-05-27 PROCEDURE — 25000003 PHARM REV CODE 250

## 2025-05-27 RX ORDER — ACETAMINOPHEN 500 MG
1000 TABLET ORAL
Status: COMPLETED | OUTPATIENT
Start: 2025-05-27 | End: 2025-05-27

## 2025-05-27 RX ORDER — CEPHALEXIN 500 MG/1
500 CAPSULE ORAL
Status: COMPLETED | OUTPATIENT
Start: 2025-05-27 | End: 2025-05-27

## 2025-05-27 RX ORDER — CEPHALEXIN 500 MG/1
500 CAPSULE ORAL 3 TIMES DAILY
Qty: 15 CAPSULE | Refills: 0 | Status: SHIPPED | OUTPATIENT
Start: 2025-05-27 | End: 2025-06-01

## 2025-05-27 RX ADMIN — CEPHALEXIN 500 MG: 500 CAPSULE ORAL at 06:05

## 2025-05-27 RX ADMIN — ACETAMINOPHEN 1000 MG: 500 TABLET ORAL at 06:05

## 2025-05-28 LAB — BACTERIA UR CULT: NORMAL

## 2025-05-28 NOTE — TELEPHONE ENCOUNTER
"Patient notified that the antibiotics will take two to three days before they start to work and that she can still have pain at this time. I advised that she can try taking Tylenol to see if that will help. Patient reports that she had a bowel movement yesterday and two days before that one. Denies constipation and reports that it is "soft." I advised the patient that if she is having pain, bad enough like she reported, "so bad this morning, I could barely get out of bed," then she would need to report to the ER for an evaluation. She asked if she was good to go to work tomorrow. I advised that she can go to work tomorrow as long as she did not start with a fever. I then advised the patient again that if she felt bad enough not to go to work and the pain was bad, she needed to report to the ER. She verbalized understanding.  "

## 2025-05-28 NOTE — TELEPHONE ENCOUNTER
Patient was seen in the ER and prescribed Keflex. She reported side pain. She was told it could be a UTI. She is reporting that she is still having side pain. Urine culture is still in process. She is asking what more can be done for the pain. Please advise.

## 2025-06-05 ENCOUNTER — TELEPHONE (OUTPATIENT)
Dept: OBSTETRICS AND GYNECOLOGY | Facility: CLINIC | Age: 20
End: 2025-06-05
Payer: COMMERCIAL

## 2025-06-05 ENCOUNTER — TELEPHONE (OUTPATIENT)
Dept: OBSTETRICS AND GYNECOLOGY | Facility: CLINIC | Age: 20
End: 2025-06-05

## 2025-06-05 ENCOUNTER — CLINICAL SUPPORT (OUTPATIENT)
Dept: OBSTETRICS AND GYNECOLOGY | Facility: CLINIC | Age: 20
End: 2025-06-05
Payer: COMMERCIAL

## 2025-06-05 DIAGNOSIS — R39.9 UTI SYMPTOMS: Primary | ICD-10-CM

## 2025-06-05 LAB
BILIRUB SERPL-MCNC: ABNORMAL MG/DL
BLOOD URINE, POC: ABNORMAL
CLARITY, POC UA: CLEAR
COLOR, POC UA: YELLOW
GLUCOSE UR QL STRIP: 100
KETONES UR QL STRIP: ABNORMAL
LEUKOCYTE ESTERASE URINE, POC: ABNORMAL
NITRITE, POC UA: ABNORMAL
PH, POC UA: 6.5
PROTEIN, POC: ABNORMAL
SPECIFIC GRAVITY, POC UA: 1.02
UROBILINOGEN, POC UA: 0.2

## 2025-06-05 PROCEDURE — 87086 URINE CULTURE/COLONY COUNT: CPT

## 2025-06-06 ENCOUNTER — RESULTS FOLLOW-UP (OUTPATIENT)
Dept: OBSTETRICS AND GYNECOLOGY | Facility: HOSPITAL | Age: 20
End: 2025-06-06
Payer: COMMERCIAL

## 2025-06-06 ENCOUNTER — HOSPITAL ENCOUNTER (EMERGENCY)
Facility: HOSPITAL | Age: 20
Discharge: HOME | End: 2025-06-06
Attending: FAMILY MEDICINE
Payer: COMMERCIAL

## 2025-06-06 VITALS
TEMPERATURE: 99 F | SYSTOLIC BLOOD PRESSURE: 114 MMHG | OXYGEN SATURATION: 100 % | HEART RATE: 78 BPM | WEIGHT: 163.13 LBS | BODY MASS INDEX: 29.84 KG/M2 | DIASTOLIC BLOOD PRESSURE: 68 MMHG | RESPIRATION RATE: 18 BRPM

## 2025-06-06 DIAGNOSIS — R10.9 ABDOMINAL PAIN IN PREGNANCY: ICD-10-CM

## 2025-06-06 DIAGNOSIS — O26.899 ABDOMINAL PAIN IN PREGNANCY: ICD-10-CM

## 2025-06-06 DIAGNOSIS — R82.71 ASYMPTOMATIC BACTERIURIA DURING PREGNANCY: Primary | ICD-10-CM

## 2025-06-06 DIAGNOSIS — O99.891 ASYMPTOMATIC BACTERIURIA DURING PREGNANCY: Primary | ICD-10-CM

## 2025-06-06 LAB
ABSOLUTE EOSINOPHIL (OHS): 0.32 K/UL
ABSOLUTE MONOCYTE (OHS): 0.56 K/UL (ref 0.3–1)
ABSOLUTE NEUTROPHIL COUNT (OHS): 4.47 K/UL (ref 1.8–7.7)
ALBUMIN SERPL BCP-MCNC: 3.1 G/DL (ref 3.5–5.2)
ALP SERPL-CCNC: 53 UNIT/L (ref 40–150)
ALT SERPL W/O P-5'-P-CCNC: 11 UNIT/L (ref 10–44)
AMORPH CRY URNS QL MICRO: ABNORMAL
ANION GAP (OHS): 6 MMOL/L (ref 8–16)
AST SERPL-CCNC: 13 UNIT/L (ref 11–45)
BACTERIA #/AREA URNS HPF: ABNORMAL /HPF
BASOPHILS # BLD AUTO: 0.06 K/UL
BASOPHILS NFR BLD AUTO: 0.8 %
BILIRUB SERPL-MCNC: 0.3 MG/DL (ref 0.1–1)
BILIRUB UR QL STRIP.AUTO: NEGATIVE
BUN SERPL-MCNC: 8 MG/DL (ref 6–20)
CALCIUM SERPL-MCNC: 8.9 MG/DL (ref 8.7–10.5)
CHLORIDE SERPL-SCNC: 106 MMOL/L (ref 95–110)
CLARITY UR: ABNORMAL
CO2 SERPL-SCNC: 23 MMOL/L (ref 23–29)
COLOR UR AUTO: YELLOW
CREAT SERPL-MCNC: 0.6 MG/DL (ref 0.5–1.4)
ERYTHROCYTE [DISTWIDTH] IN BLOOD BY AUTOMATED COUNT: 13.6 % (ref 11.5–14.5)
GFR SERPLBLD CREATININE-BSD FMLA CKD-EPI: >60 ML/MIN/1.73/M2
GLUCOSE SERPL-MCNC: 82 MG/DL (ref 70–110)
GLUCOSE UR QL STRIP: NEGATIVE
HCT VFR BLD AUTO: 36.9 % (ref 37–48.5)
HGB BLD-MCNC: 12.4 GM/DL (ref 12–16)
HGB UR QL STRIP: NEGATIVE
HOLD SPECIMEN: NORMAL
HYALINE CASTS #/AREA URNS LPF: 1 /LPF (ref 0–1)
IMM GRANULOCYTES # BLD AUTO: 0.02 K/UL (ref 0–0.04)
IMM GRANULOCYTES NFR BLD AUTO: 0.3 % (ref 0–0.5)
KETONES UR QL STRIP: NEGATIVE
LEUKOCYTE ESTERASE UR QL STRIP: ABNORMAL
LIPASE SERPL-CCNC: 12 U/L (ref 4–60)
LYMPHOCYTES # BLD AUTO: 1.96 K/UL (ref 1–4.8)
MCH RBC QN AUTO: 29.5 PG (ref 27–31)
MCHC RBC AUTO-ENTMCNC: 33.6 G/DL (ref 32–36)
MCV RBC AUTO: 88 FL (ref 82–98)
MICROSCOPIC COMMENT: ABNORMAL
NITRITE UR QL STRIP: NEGATIVE
NUCLEATED RBC (/100WBC) (OHS): 0 /100 WBC
PH UR STRIP: >8 [PH]
PLATELET # BLD AUTO: 217 K/UL (ref 150–450)
PMV BLD AUTO: 10.4 FL (ref 9.2–12.9)
POTASSIUM SERPL-SCNC: 4.1 MMOL/L (ref 3.5–5.1)
PROT SERPL-MCNC: 7.2 GM/DL (ref 6–8.4)
PROT UR QL STRIP: NEGATIVE
RBC # BLD AUTO: 4.21 M/UL (ref 4–5.4)
RBC #/AREA URNS HPF: 1 /HPF (ref 0–4)
RELATIVE EOSINOPHIL (OHS): 4.3 %
RELATIVE LYMPHOCYTE (OHS): 26.5 % (ref 18–48)
RELATIVE MONOCYTE (OHS): 7.6 % (ref 4–15)
RELATIVE NEUTROPHIL (OHS): 60.5 % (ref 38–73)
SODIUM SERPL-SCNC: 135 MMOL/L (ref 136–145)
SP GR UR STRIP: 1.01
SQUAMOUS #/AREA URNS HPF: 9 /HPF
UROBILINOGEN UR STRIP-ACNC: NEGATIVE EU/DL
WBC # BLD AUTO: 7.39 K/UL (ref 3.9–12.7)
WBC #/AREA URNS HPF: 5 /HPF (ref 0–5)

## 2025-06-06 PROCEDURE — 81003 URINALYSIS AUTO W/O SCOPE: CPT | Performed by: FAMILY MEDICINE

## 2025-06-06 PROCEDURE — 85025 COMPLETE CBC W/AUTO DIFF WBC: CPT | Performed by: FAMILY MEDICINE

## 2025-06-06 PROCEDURE — 99284 EMERGENCY DEPT VISIT MOD MDM: CPT | Mod: 25

## 2025-06-06 PROCEDURE — 83690 ASSAY OF LIPASE: CPT | Performed by: FAMILY MEDICINE

## 2025-06-06 PROCEDURE — 80053 COMPREHEN METABOLIC PANEL: CPT | Performed by: FAMILY MEDICINE

## 2025-06-06 RX ORDER — AMOXICILLIN AND CLAVULANATE POTASSIUM 875; 125 MG/1; MG/1
1 TABLET, FILM COATED ORAL 2 TIMES DAILY
Qty: 10 TABLET | Refills: 0 | Status: SHIPPED | OUTPATIENT
Start: 2025-06-06 | End: 2025-06-11

## 2025-06-06 NOTE — Clinical Note
"Nithin Downscruz Hammonds was seen and treated in our emergency department on 6/6/2025.  She may return to work on 06/09/2025.       If you have any questions or concerns, please don't hesitate to call.      Sarina Morales MD"

## 2025-06-06 NOTE — ED PROVIDER NOTES
Encounter Date: 2025       History     Chief Complaint   Patient presents with    Urinary Tract Infection     Patient 19 weeks pregnant to ER CC of cloudy urine and lower right abd pain, states she was seen here in the ER a week ago DX with a UTI     HPI  20-year-old female  at 19 weeks 2 days who presents to the ED with a past medical history of ADHD.  She states that she is having right lower quadrant abdominal pain she states that she was diagnosed with a UTI 1 week ago in the ED. patient is not currently on antibiotics.  She denies any chest pain, shortness of breath.  No fevers, chills.  No dysuria, hematuria.  Review of patient's allergies indicates:  No Known Allergies  Past Medical History:   Diagnosis Date    ADHD      Past Surgical History:   Procedure Laterality Date    EVACUATION OF HEMATOMA N/A 2024    Procedure: HEMOPERITONEUM REMOVAL;  Surgeon: Krysta Landry MD;  Location: [facility] OR;  Service: OB/GYN;  Laterality: N/A;    LAPAROSCOPIC SALPINGECTOMY Left 2024    Procedure: SALPINGECTOMY, LAPAROSCOPIC;  Surgeon: Krysta Landry MD;  Location: [facility] OR;  Service: OB/GYN;  Laterality: Left;    SALPINGECTOMY      SALPINGOSTOMY Left 2024    Procedure: LAPAROSCOPIC SALPINGOSTOMY (ATTEMPTED);  Surgeon: Krysta Landry MD;  Location: [facility] OR;  Service: OB/GYN;  Laterality: Left;    THROAT SURGERY       Family History   Problem Relation Name Age of Onset    Breast cancer Maternal Grandmother      Hypertension Father      Hypertension Mother      Anemia Mother      Hypertension Brother      Hypertension Sister      Colon cancer Neg Hx      Ovarian cancer Neg Hx       Social History[1]  Review of Systems   Constitutional:  Negative for chills and fever.   HENT:  Negative for sore throat.    Eyes:  Negative for photophobia.   Respiratory:  Negative for shortness of breath and wheezing.    Cardiovascular:  Negative for chest pain and palpitations.   Gastrointestinal:  Positive  for abdominal pain.   Genitourinary:  Negative for dysuria.   Skin:  Negative for rash.   Neurological:  Negative for headaches.   Psychiatric/Behavioral:  Negative for behavioral problems.    All other systems reviewed and are negative.      Physical Exam     Initial Vitals [06/06/25 0726]   BP Pulse Resp Temp SpO2   (!) 118/55 81 20 98.4 °F (36.9 °C) 97 %      MAP       --         Physical Exam    Constitutional: She appears well-developed and well-nourished. She is not diaphoretic. No distress.   HENT:   Head: Normocephalic and atraumatic.   Right Ear: External ear normal.   Left Ear: External ear normal.   Eyes: EOM are normal. Pupils are equal, round, and reactive to light.   Neck:   Normal range of motion.  Cardiovascular:  Normal rate and regular rhythm.           No murmur heard.  Pulmonary/Chest: Breath sounds normal. No respiratory distress. She has no wheezes.   Abdominal: Abdomen is soft. She exhibits no distension. There is abdominal tenderness. There is no rebound and no guarding.   Musculoskeletal:         General: Normal range of motion.      Cervical back: Normal range of motion.     Neurological: She is alert and oriented to person, place, and time. GCS score is 15. GCS eye subscore is 4. GCS verbal subscore is 5. GCS motor subscore is 6.   Skin: Skin is warm and dry. No rash noted.   Psychiatric: She has a normal mood and affect.         ED Course   Procedures  Labs Reviewed   COMPREHENSIVE METABOLIC PANEL - Abnormal       Result Value    Sodium 135 (*)     Potassium 4.1      Chloride 106      CO2 23      Glucose 82      BUN 8      Creatinine 0.6      Calcium 8.9      Protein Total 7.2      Albumin 3.1 (*)     Bilirubin Total 0.3      ALP 53      AST 13      ALT 11      Anion Gap 6 (*)     eGFR >60     URINALYSIS, REFLEX TO URINE CULTURE - Abnormal    Color, UA Yellow      Appearance, UA Cloudy (*)     pH, UA >8.0 (*)     Spec Grav UA 1.015      Protein, UA Negative      Glucose, UA Negative       Ketones, UA Negative      Bilirubin, UA Negative      Blood, UA Negative      Nitrites, UA Negative      Urobilinogen, UA Negative      Leukocyte Esterase, UA 1+ (*)    CBC WITH DIFFERENTIAL - Abnormal    WBC 7.39      RBC 4.21      HGB 12.4      HCT 36.9 (*)     MCV 88      MCH 29.5      MCHC 33.6      RDW 13.6      Platelet Count 217      MPV 10.4      Nucleated RBC 0      Neut % 60.5      Lymph % 26.5      Mono % 7.6      Eos % 4.3      Basophil % 0.8      Imm Grans % 0.3      Neut # 4.47      Lymph # 1.96      Mono # 0.56      Eos # 0.32      Baso # 0.06      Imm Grans # 0.02     URINALYSIS MICROSCOPIC - Abnormal    RBC, UA 1      WBC, UA 5      Bacteria, UA Few (*)     Squamous Epithelial Cells, UA 9      Hyaline Casts, UA 1      Amorphous, UA Few      Microscopic Comment       LIPASE - Normal    Lipase Level 12     CBC W/ AUTO DIFFERENTIAL    Narrative:     The following orders were created for panel order CBC W/ AUTO DIFFERENTIAL.  Procedure                               Abnormality         Status                     ---------                               -----------         ------                     CBC with Differential[[id number]]       Abnormal            Final result                 Please view results for these tests on the individual orders.   GREY TOP URINE HOLD    Extra Tube Hold for add-ons.            Imaging Results              US OB 14+ Wks, TransAbd, Single Gestation (Final result)  Result time 06/06/25 08:44:47      Final result by Denice Sullivan MD (06/06/25 08:44:47)                   Impression:      Single live intrauterine gestation with a combine biometric derived estimated gestational age of 20 weeks 5 days and an DAVID of 10/19/2025.    Amniotic fluid volume is within normal limits and there is no evidence of placenta previa.      Electronically signed by: Denice Sullivan MD  Date:    06/06/2025  Time:    08:44               Narrative:    EXAMINATION:  US OB 14+ WEEKS, TRANSABDOM,  SINGLE GESTATION    CLINICAL HISTORY:  Other specified pregnancy related conditions, unspecified trimester    TECHNIQUE:  Transabdominal 2nd or 3rd trimester obstetrical ultrasound was performed.    COMPARISON:  2025    FINDINGS:  There is a single, cephalic, fetal sex unknown, intrauterine gestation with a combined biometric derived estimated gestational age of 20 weeks 5 days and an DAVID of 10/19/2025.  Estimated fetal weight is 14 oz.  The individual biometric parameters are as follows:    BPD: 4.6 cm    Head circumference: 17.4 cm    Abdominal circumference: 15.2 cm    Femur length: 3.8 cm    Amniotic fluid volume is within normal limits with a maximum vertical pocket of 3.8 cm.  Fetal cardiac activity is seen with a rate of 149 beats per minute on M-mode imaging.    The cervix appears closed and there is no evidence placenta previa.  The placenta is anterior.  The cervical length is 4.1 cm cm.    The left ovary could not be seen.  The right ovary is normal in size and demonstrates normal arterial and venous perfusion.                                       Medications - No data to display  Medical Decision Making  Differential diagnosis:  UTI, intra-abdominal concern, renal stone    20-year-old female  that presents to the ED with right lower quadrant pain.  Patient is still with bacteria in urine.  No blood noted.  Vital signs stable.  Patient will be treated for asymptomatic bacteriuria.  Patient understands strict return precautions.  Close follow-up with OBGYN.  All questions answered prior to discharge home.    Amount and/or Complexity of Data Reviewed  Labs: ordered.  Radiology: ordered.    Risk  Prescription drug management.                                      Clinical Impression:  Final diagnoses:  [O26.899, R10.9] Abdominal pain in pregnancy  [O99.891, R82.71] Asymptomatic bacteriuria during pregnancy (Primary)          ED Disposition Condition    Discharge Stable          ED Prescriptions        Medication Sig Dispense Start Date End Date Auth. Provider    amoxicillin-clavulanate 875-125mg (AUGMENTIN) 875-125 mg per tablet Take 1 tablet by mouth 2 (two) times daily. for 5 days 10 tablet 6/6/2025 6/11/2025 Sarina Morales MD          Follow-up Information       Follow up With Specialties Details Why Contact Info    Almita Beck MD Obstetrics and Gynecology Schedule an appointment as soon as possible for a visit   [address] [address]  [phone]                     [1]   Social History  Tobacco Use    Smoking status: Never    Smokeless tobacco: Never   Substance Use Topics    Alcohol use: Not Currently    Drug use: Never        Sarina Morales MD  06/06/25 [id number]

## 2025-06-10 NOTE — PROGRESS NOTES
This sounds like normal discomfort associated with pregnancy. The discharge seems normal as well. If she begins to have itching, burning etc., we can get cultures.

## 2025-06-11 ENCOUNTER — PATIENT MESSAGE (OUTPATIENT)
Dept: OTHER | Facility: OTHER | Age: 20
End: 2025-06-11
Payer: COMMERCIAL

## 2025-06-11 ENCOUNTER — ROUTINE PRENATAL (OUTPATIENT)
Dept: OBSTETRICS AND GYNECOLOGY | Facility: CLINIC | Age: 20
End: 2025-06-11
Payer: COMMERCIAL

## 2025-06-11 ENCOUNTER — PROCEDURE VISIT (OUTPATIENT)
Dept: MATERNAL FETAL MEDICINE | Facility: CLINIC | Age: 20
End: 2025-06-11
Payer: COMMERCIAL

## 2025-06-11 VITALS
HEART RATE: 88 BPM | BODY MASS INDEX: 31.25 KG/M2 | DIASTOLIC BLOOD PRESSURE: 72 MMHG | SYSTOLIC BLOOD PRESSURE: 118 MMHG | WEIGHT: 170.88 LBS

## 2025-06-11 DIAGNOSIS — Z34.92 NORMAL PREGNANCY IN SECOND TRIMESTER: ICD-10-CM

## 2025-06-11 DIAGNOSIS — Z34.92 NORMAL PREGNANCY IN SECOND TRIMESTER: Primary | ICD-10-CM

## 2025-06-11 PROCEDURE — 99999 PR PBB SHADOW E&M-EST. PATIENT-LVL II: CPT | Mod: PBBFAC,,, | Performed by: STUDENT IN AN ORGANIZED HEALTH CARE EDUCATION/TRAINING PROGRAM

## 2025-06-11 PROCEDURE — 76805 OB US >/= 14 WKS SNGL FETUS: CPT | Mod: S$GLB,,, | Performed by: STUDENT IN AN ORGANIZED HEALTH CARE EDUCATION/TRAINING PROGRAM

## 2025-06-11 PROCEDURE — 0502F SUBSEQUENT PRENATAL CARE: CPT | Mod: CPTII,S$GLB,, | Performed by: STUDENT IN AN ORGANIZED HEALTH CARE EDUCATION/TRAINING PROGRAM

## 2025-06-11 NOTE — PROGRESS NOTES
Patient with no complaints. Denies vaginal bleeding or cramping.  Good FM. Discussed with patient having glucose testing for gestational diabetes preformed between 24-28 weeks. RTC in 4 weeks.     Coffective counseling sheet Learn Your Baby and Protect Breastfeeding discussed with mother. Instructed regarding feeding cues and methods to calm baby. Encouraged mother to download Coffective mobile munir if she has not already done so.  Mother verbalized understanding.    Vitals signs, FHTs, urine dip, and PE findings documented, reviewed and available in OB flow chart.    I spent a total of 20 minutes on the day of the visit. This includes face to face time and non-face to face time preparing to see the patient (eg, review of tests), Obtaining and/or reviewing separately obtained history, Documenting clinical information in the electronic or other health record, Independently interpreting resultsand communicating results to the patient/family/caregiver, or Care coordination.

## 2025-07-02 ENCOUNTER — PATIENT MESSAGE (OUTPATIENT)
Dept: OBSTETRICS AND GYNECOLOGY | Facility: CLINIC | Age: 20
End: 2025-07-02
Payer: MEDICAID

## 2025-07-09 ENCOUNTER — PATIENT MESSAGE (OUTPATIENT)
Dept: OTHER | Facility: OTHER | Age: 20
End: 2025-07-09
Payer: MEDICAID

## 2025-07-09 NOTE — PROGRESS NOTES
Labs reviewed, wnl. Mat 21 neg; male.  Pt reminded to continue connected moms readings.  Anatomy reviewed , wnl.   Patient with no complaints. Denies vaginal bleeding or contractions. Good FM. GTT/CBC/RPR and indirect ken (if applicable) ordered today.   labor precautions discussed with patient. RTC in 4 weeks. Encouraged to attend prenatal classes , ordered breast pump.    Vitals signs, FHTs, urine dip, and PE findings documented, reviewed and available in OB flow chart.       I spent a total of 20 minutes on the day of the visit.This includes face to face time and non-face to face time preparing to see the patient (eg, review of tests), Obtaining and/or reviewing separately obtained history, Documenting clinical information in the electronic or other health record, Independently interpreting resultsand communicating results to the patient/family/caregiver, or Care coordination.     Coffective counseling sheet Nourish discussed with mother. Reinforced basic breastfeeding position and latch as well as proper hand expression technique and avoidance of artificial nipples and formula unless medically indicated. Encouraged mother to download Coffective mobile munir if she has not already done so.  Mother verbalizes understanding.

## 2025-07-10 ENCOUNTER — ROUTINE PRENATAL (OUTPATIENT)
Dept: OBSTETRICS AND GYNECOLOGY | Facility: CLINIC | Age: 20
End: 2025-07-10
Payer: MEDICAID

## 2025-07-10 VITALS
SYSTOLIC BLOOD PRESSURE: 116 MMHG | HEART RATE: 98 BPM | WEIGHT: 175.5 LBS | DIASTOLIC BLOOD PRESSURE: 82 MMHG | BODY MASS INDEX: 32.1 KG/M2

## 2025-07-10 DIAGNOSIS — Z3A.24 24 WEEKS GESTATION OF PREGNANCY: ICD-10-CM

## 2025-07-10 DIAGNOSIS — Z34.82 ENCOUNTER FOR SUPERVISION OF OTHER NORMAL PREGNANCY IN SECOND TRIMESTER: Primary | ICD-10-CM

## 2025-07-10 PROCEDURE — 99212 OFFICE O/P EST SF 10 MIN: CPT | Mod: PBBFAC,TH

## 2025-07-10 PROCEDURE — 99999 PR PBB SHADOW E&M-EST. PATIENT-LVL II: CPT | Mod: PBBFAC,,,

## 2025-07-21 ENCOUNTER — TELEPHONE (OUTPATIENT)
Dept: OBSTETRICS AND GYNECOLOGY | Facility: CLINIC | Age: 20
End: 2025-07-21
Payer: MEDICAID

## 2025-07-21 NOTE — TELEPHONE ENCOUNTER
I called and spoke with the patient. She reports that she had lower pelvic pain when she was laying on her side, then felt it again when she sat up and stood. She reports it is worse with movement, but the pain resolved today. I let the patient know that it can be round ligament pain. Patient educated on round ligament pain and that a belly band can help and Tylenol. Patient reports that she has been taking Tylenol because she has been having headaches. Patient said she moved and packed up her blood pressure cuff for [employer]. I let her know that she needs to be monitoring her blood pressure due to her having headaches. I explained that we monitor her blood pressure to make sure her readings are not high and a cause for concern for pre-eclampsia. I told the patient to make sure she sits for five minutes, after walking, and does not have a full bladder, before taking her blood pressure. I let her know that the doctor receives an alert if her blood pressure is high. The patient verbalized understanding.

## 2025-07-21 NOTE — TELEPHONE ENCOUNTER
----- Message from Med Assistant Wells sent at 7/21/2025 11:12 AM CDT -----  Contact: self  Nithin Hammonds  MRN: [id number]  Home Phone      Not on file.  Work Phone      Not on file.  Mobile          [phone]    Patient Care Team:  Fan James NP as PCP - General (Family Medicine)  Manuel Goyal MD as Obstetrician (Obstetrics)  Katey Kirkpatrick MD as Consulting Physician (Obstetrics and Gynecology)  Almita Beck MD as Consulting Physician (Obstetrics and Gynecology)  Mariel Cheung CNM as Midwife (Obstetrics and Gynecology)  OB? Yes, 25w5d  What phone number can you be reached at? [phone]  Message:  Would like to speak to nurse regarding pain under stomach over weekend.

## 2025-07-23 ENCOUNTER — PATIENT MESSAGE (OUTPATIENT)
Dept: OTHER | Facility: OTHER | Age: 20
End: 2025-07-23
Payer: MEDICAID

## 2025-07-28 ENCOUNTER — LAB VISIT (OUTPATIENT)
Dept: LAB | Facility: HOSPITAL | Age: 20
End: 2025-07-28
Payer: MEDICAID

## 2025-07-28 DIAGNOSIS — Z34.82 ENCOUNTER FOR SUPERVISION OF OTHER NORMAL PREGNANCY IN SECOND TRIMESTER: ICD-10-CM

## 2025-07-28 LAB
ABSOLUTE EOSINOPHIL (OHS): 0.4 K/UL
ABSOLUTE MONOCYTE (OHS): 0.64 K/UL (ref 0.3–1)
ABSOLUTE NEUTROPHIL COUNT (OHS): 5.35 K/UL (ref 1.8–7.7)
BASOPHILS # BLD AUTO: 0.03 K/UL
BASOPHILS NFR BLD AUTO: 0.4 %
ERYTHROCYTE [DISTWIDTH] IN BLOOD BY AUTOMATED COUNT: 13.4 % (ref 11.5–14.5)
GLUCOSE SERPL-MCNC: 95 MG/DL (ref 70–140)
HCT VFR BLD AUTO: 33 % (ref 37–48.5)
HGB BLD-MCNC: 10.8 GM/DL (ref 12–16)
HIV 1+2 AB+HIV1 P24 AG SERPL QL IA: NORMAL
IMM GRANULOCYTES # BLD AUTO: 0.03 K/UL (ref 0–0.04)
IMM GRANULOCYTES NFR BLD AUTO: 0.4 % (ref 0–0.5)
LYMPHOCYTES # BLD AUTO: 1.56 K/UL (ref 1–4.8)
MCH RBC QN AUTO: 29.2 PG (ref 27–31)
MCHC RBC AUTO-ENTMCNC: 32.7 G/DL (ref 32–36)
MCV RBC AUTO: 89 FL (ref 82–98)
NUCLEATED RBC (/100WBC) (OHS): 0 /100 WBC
PLATELET # BLD AUTO: 209 K/UL (ref 150–450)
PMV BLD AUTO: 9.3 FL (ref 9.2–12.9)
RBC # BLD AUTO: 3.7 M/UL (ref 4–5.4)
RELATIVE EOSINOPHIL (OHS): 5 %
RELATIVE LYMPHOCYTE (OHS): 19.5 % (ref 18–48)
RELATIVE MONOCYTE (OHS): 8 % (ref 4–15)
RELATIVE NEUTROPHIL (OHS): 66.7 % (ref 38–73)
T PALLIDUM IGG+IGM SER QL: NORMAL
WBC # BLD AUTO: 8.01 K/UL (ref 3.9–12.7)

## 2025-07-28 PROCEDURE — 86593 SYPHILIS TEST NON-TREP QUANT: CPT

## 2025-07-28 PROCEDURE — 82950 GLUCOSE TEST: CPT

## 2025-07-28 PROCEDURE — 85025 COMPLETE CBC W/AUTO DIFF WBC: CPT

## 2025-07-28 PROCEDURE — 36415 COLL VENOUS BLD VENIPUNCTURE: CPT

## 2025-07-28 PROCEDURE — 87389 HIV-1 AG W/HIV-1&-2 AB AG IA: CPT

## 2025-08-01 ENCOUNTER — ROUTINE PRENATAL (OUTPATIENT)
Dept: OBSTETRICS AND GYNECOLOGY | Facility: CLINIC | Age: 20
End: 2025-08-01
Payer: MEDICAID

## 2025-08-01 VITALS
BODY MASS INDEX: 33.67 KG/M2 | SYSTOLIC BLOOD PRESSURE: 124 MMHG | DIASTOLIC BLOOD PRESSURE: 78 MMHG | HEART RATE: 95 BPM | WEIGHT: 184.06 LBS

## 2025-08-01 DIAGNOSIS — Z34.92 NORMAL PREGNANCY IN SECOND TRIMESTER: Primary | ICD-10-CM

## 2025-08-01 DIAGNOSIS — Z23 NEED FOR TDAP VACCINATION: ICD-10-CM

## 2025-08-01 PROCEDURE — 99213 OFFICE O/P EST LOW 20 MIN: CPT | Mod: 25,S$PBB,TH, | Performed by: STUDENT IN AN ORGANIZED HEALTH CARE EDUCATION/TRAINING PROGRAM

## 2025-08-01 PROCEDURE — 99212 OFFICE O/P EST SF 10 MIN: CPT | Mod: PBBFAC,TH | Performed by: STUDENT IN AN ORGANIZED HEALTH CARE EDUCATION/TRAINING PROGRAM

## 2025-08-01 PROCEDURE — 99999 PR PBB SHADOW E&M-EST. PATIENT-LVL II: CPT | Mod: PBBFAC,,, | Performed by: STUDENT IN AN ORGANIZED HEALTH CARE EDUCATION/TRAINING PROGRAM

## 2025-08-01 NOTE — PROGRESS NOTES
Patient with headaches almost daily. Denies vaginal bleeding or contractions. Good FM. Tdap discussed and ordered today.  Discussed fetal kick count instructions with patient to monitor fetal movement. RTC in 2 weeks.    Coffective counseling sheet Keep Baby Close discussed with mother. Reinforced rooming in practices, continued skin to skin, and quiet hours as requested by mother.  Encouraged mother to download Coffective mobile munir if she has not already done so. Mother verbalizes understanding.    Vitals signs, FHTs, urine dip, and PE findings documented, reviewed and available in OB flow chart.    I spent a total of 20 minutes on the day of the visit. This includes face to face time and non-face to face time preparing to see the patient (eg, review of tests), Obtaining and/or reviewing separately obtained history, Documenting clinical information in the electronic or other health record, Independently interpreting resultsand communicating results to the patient/family/caregiver, or Care coordination.

## 2025-08-06 ENCOUNTER — PATIENT MESSAGE (OUTPATIENT)
Dept: OTHER | Facility: OTHER | Age: 20
End: 2025-08-06
Payer: MEDICAID

## 2025-08-11 ENCOUNTER — PATIENT MESSAGE (OUTPATIENT)
Dept: OBSTETRICS AND GYNECOLOGY | Facility: CLINIC | Age: 20
End: 2025-08-11
Payer: MEDICAID

## 2025-08-12 ENCOUNTER — ROUTINE PRENATAL (OUTPATIENT)
Facility: CLINIC | Age: 20
End: 2025-08-12
Payer: MEDICAID

## 2025-08-12 VITALS
DIASTOLIC BLOOD PRESSURE: 74 MMHG | WEIGHT: 187.38 LBS | BODY MASS INDEX: 34.27 KG/M2 | SYSTOLIC BLOOD PRESSURE: 122 MMHG

## 2025-08-12 DIAGNOSIS — Z34.93 NORMAL PREGNANCY IN THIRD TRIMESTER: Primary | ICD-10-CM

## 2025-08-12 PROCEDURE — 99999 PR PBB SHADOW E&M-EST. PATIENT-LVL II: CPT | Mod: PBBFAC,,, | Performed by: STUDENT IN AN ORGANIZED HEALTH CARE EDUCATION/TRAINING PROGRAM

## 2025-08-12 PROCEDURE — 99212 OFFICE O/P EST SF 10 MIN: CPT | Mod: PBBFAC,TH,PN | Performed by: STUDENT IN AN ORGANIZED HEALTH CARE EDUCATION/TRAINING PROGRAM

## 2025-08-12 PROCEDURE — 99213 OFFICE O/P EST LOW 20 MIN: CPT | Mod: 25,S$PBB,TH, | Performed by: STUDENT IN AN ORGANIZED HEALTH CARE EDUCATION/TRAINING PROGRAM

## 2025-08-18 ENCOUNTER — HOSPITAL ENCOUNTER (OUTPATIENT)
Facility: HOSPITAL | Age: 20
Discharge: HOME | End: 2025-08-18
Attending: STUDENT IN AN ORGANIZED HEALTH CARE EDUCATION/TRAINING PROGRAM | Admitting: STUDENT IN AN ORGANIZED HEALTH CARE EDUCATION/TRAINING PROGRAM
Payer: MEDICAID

## 2025-08-18 VITALS
OXYGEN SATURATION: 98 % | RESPIRATION RATE: 18 BRPM | DIASTOLIC BLOOD PRESSURE: 59 MMHG | TEMPERATURE: 99 F | HEART RATE: 99 BPM | SYSTOLIC BLOOD PRESSURE: 110 MMHG

## 2025-08-18 PROBLEM — O36.8130 DECREASED FETAL MOVEMENTS IN THIRD TRIMESTER: Status: ACTIVE | Noted: 2025-08-18

## 2025-08-18 PROCEDURE — 59025 FETAL NON-STRESS TEST: CPT | Mod: 26,,, | Performed by: STUDENT IN AN ORGANIZED HEALTH CARE EDUCATION/TRAINING PROGRAM

## 2025-08-18 PROCEDURE — 99211 OFF/OP EST MAY X REQ PHY/QHP: CPT

## 2025-08-18 PROCEDURE — 59025 FETAL NON-STRESS TEST: CPT

## 2025-08-18 RX ORDER — ACETAMINOPHEN 500 MG
500 TABLET ORAL EVERY 6 HOURS PRN
Status: DISCONTINUED | OUTPATIENT
Start: 2025-08-18 | End: 2025-08-18 | Stop reason: HOSPADM

## 2025-08-18 RX ORDER — ONDANSETRON 8 MG/1
8 TABLET, ORALLY DISINTEGRATING ORAL EVERY 8 HOURS PRN
Status: DISCONTINUED | OUTPATIENT
Start: 2025-08-18 | End: 2025-08-18 | Stop reason: HOSPADM

## 2025-08-20 ENCOUNTER — PATIENT MESSAGE (OUTPATIENT)
Dept: OTHER | Facility: OTHER | Age: 20
End: 2025-08-20
Payer: MEDICAID

## 2025-08-21 ENCOUNTER — CLINICAL SUPPORT (OUTPATIENT)
Dept: OBSTETRICS AND GYNECOLOGY | Facility: CLINIC | Age: 20
End: 2025-08-21
Payer: MEDICAID

## 2025-08-21 ENCOUNTER — TELEPHONE (OUTPATIENT)
Dept: OBSTETRICS AND GYNECOLOGY | Facility: CLINIC | Age: 20
End: 2025-08-21
Payer: MEDICAID

## 2025-08-21 ENCOUNTER — ROUTINE PRENATAL (OUTPATIENT)
Dept: OBSTETRICS AND GYNECOLOGY | Facility: CLINIC | Age: 20
End: 2025-08-21
Payer: MEDICAID

## 2025-08-21 VITALS
BODY MASS INDEX: 35.38 KG/M2 | SYSTOLIC BLOOD PRESSURE: 124 MMHG | WEIGHT: 193.44 LBS | DIASTOLIC BLOOD PRESSURE: 78 MMHG | HEART RATE: 94 BPM

## 2025-08-21 DIAGNOSIS — O26.893 ABDOMINAL PAIN DURING PREGNANCY IN THIRD TRIMESTER: Primary | ICD-10-CM

## 2025-08-21 DIAGNOSIS — R10.9 ABDOMINAL PAIN DURING PREGNANCY IN THIRD TRIMESTER: Primary | ICD-10-CM

## 2025-08-21 PROCEDURE — 59025 FETAL NON-STRESS TEST: CPT | Mod: PBBFAC | Performed by: STUDENT IN AN ORGANIZED HEALTH CARE EDUCATION/TRAINING PROGRAM

## 2025-08-21 PROCEDURE — 99212 OFFICE O/P EST SF 10 MIN: CPT | Mod: PBBFAC,TH,25 | Performed by: STUDENT IN AN ORGANIZED HEALTH CARE EDUCATION/TRAINING PROGRAM

## 2025-08-21 PROCEDURE — 99999 PR PBB SHADOW E&M-EST. PATIENT-LVL II: CPT | Mod: PBBFAC,,, | Performed by: STUDENT IN AN ORGANIZED HEALTH CARE EDUCATION/TRAINING PROGRAM

## 2025-08-21 PROCEDURE — 99213 OFFICE O/P EST LOW 20 MIN: CPT | Mod: 25,S$PBB,TH, | Performed by: STUDENT IN AN ORGANIZED HEALTH CARE EDUCATION/TRAINING PROGRAM

## 2025-08-26 ENCOUNTER — ROUTINE PRENATAL (OUTPATIENT)
Facility: CLINIC | Age: 20
End: 2025-08-26
Payer: MEDICAID

## 2025-08-26 VITALS
SYSTOLIC BLOOD PRESSURE: 122 MMHG | WEIGHT: 192.13 LBS | BODY MASS INDEX: 35.14 KG/M2 | DIASTOLIC BLOOD PRESSURE: 70 MMHG

## 2025-08-26 DIAGNOSIS — O26.893 ABDOMINAL PAIN DURING PREGNANCY IN THIRD TRIMESTER: Primary | ICD-10-CM

## 2025-08-26 DIAGNOSIS — R10.9 ABDOMINAL PAIN DURING PREGNANCY IN THIRD TRIMESTER: Primary | ICD-10-CM

## 2025-08-26 PROCEDURE — 99999 PR PBB SHADOW E&M-EST. PATIENT-LVL II: CPT | Mod: PBBFAC,,, | Performed by: STUDENT IN AN ORGANIZED HEALTH CARE EDUCATION/TRAINING PROGRAM

## 2025-08-26 PROCEDURE — 99213 OFFICE O/P EST LOW 20 MIN: CPT | Mod: 25,S$PBB,TH, | Performed by: STUDENT IN AN ORGANIZED HEALTH CARE EDUCATION/TRAINING PROGRAM

## 2025-08-26 PROCEDURE — 99212 OFFICE O/P EST SF 10 MIN: CPT | Mod: PBBFAC,TH,PN | Performed by: STUDENT IN AN ORGANIZED HEALTH CARE EDUCATION/TRAINING PROGRAM
